# Patient Record
Sex: MALE | Race: WHITE | NOT HISPANIC OR LATINO | Employment: FULL TIME | URBAN - NONMETROPOLITAN AREA
[De-identification: names, ages, dates, MRNs, and addresses within clinical notes are randomized per-mention and may not be internally consistent; named-entity substitution may affect disease eponyms.]

---

## 2017-02-27 ENCOUNTER — OFFICE VISIT (OUTPATIENT)
Dept: UROLOGY | Facility: CLINIC | Age: 47
End: 2017-02-27

## 2017-02-27 DIAGNOSIS — R30.0 BURNING WITH URINATION: Primary | ICD-10-CM

## 2017-02-27 DIAGNOSIS — Z84.1 FAMILY HISTORY OF KIDNEY STONES: ICD-10-CM

## 2017-02-27 DIAGNOSIS — N13.8 BPH (BENIGN PROSTATIC HYPERTROPHY) WITH URINARY OBSTRUCTION: ICD-10-CM

## 2017-02-27 DIAGNOSIS — N40.1 BPH (BENIGN PROSTATIC HYPERTROPHY) WITH URINARY OBSTRUCTION: ICD-10-CM

## 2017-02-27 DIAGNOSIS — N41.1 CHRONIC PROSTATITIS: ICD-10-CM

## 2017-02-27 LAB
BILIRUB BLD-MCNC: NEGATIVE MG/DL
CLARITY, POC: CLEAR
COLOR UR: YELLOW
GLUCOSE UR STRIP-MCNC: NEGATIVE MG/DL
KETONES UR QL: NEGATIVE
LEUKOCYTE EST, POC: NEGATIVE
NITRITE UR-MCNC: NEGATIVE MG/ML
PH UR: 6 [PH] (ref 5–8)
PROT UR STRIP-MCNC: NEGATIVE MG/DL
RBC # UR STRIP: NEGATIVE /UL
SP GR UR: 1.01 (ref 1–1.03)
UROBILINOGEN UR QL: NORMAL

## 2017-02-27 PROCEDURE — 99214 OFFICE O/P EST MOD 30 MIN: CPT | Performed by: UROLOGY

## 2017-02-27 PROCEDURE — 76857 US EXAM PELVIC LIMITED: CPT | Performed by: UROLOGY

## 2017-02-27 PROCEDURE — 81003 URINALYSIS AUTO W/O SCOPE: CPT | Performed by: UROLOGY

## 2017-02-27 RX ORDER — AZITHROMYCIN 250 MG/1
TABLET, FILM COATED ORAL
Refills: 0 | COMMUNITY
Start: 2017-02-23 | End: 2017-05-24

## 2017-02-27 NOTE — PROGRESS NOTES
Chief Complaint  Follow-up (patient is having burning and pain with urination.)        POPPY Le is a 46 y.o. male who presents requesting evaluation with a recent history of dysuria.  He and his wife are  and he's had exposure to sexually transmitted disease.  Other than dysuria he denies other signs and symptoms such as visible lesions on the genital area and obvious discharge.  Previous screenings for chlamydia hepatitis and HIV were all negative.  He has recently completed a Z-Jonathan for upper respiratory infection but denies taking any decongestants and antihistamines.  He was seen at [facility] where he was thought to have a UTI and was started on Keflex.  The urine culture returned insignificant organisms.  Recently any new sexual contacts have been protected with condom.    There were no vitals filed for this visit.    Past Medical History  Past Medical History   Diagnosis Date   • Arthritis    • Deep vein thrombosis    • Hemochromatosis    • Hemochromatosis    • Hypertension    • Migraine    • Sleep apnea        Past Surgical History  Past Surgical History   Procedure Laterality Date   • Colonoscopy     • Eye surgery       LASIX   • Hand surgery     • Wrist surgery         Medications  has a current medication list which includes the following prescription(s): alprazolam, azithromycin, cephalexin, cephalexin, chlorhexidine, doxycycline, gabapentin, gnp antiseptic skin cleanser, ketoconazole, losartan, mupirocin, sertraline, testosterone cypionate, valacyclovir, viagra, and warfarin.      Allergies  No Known Allergies    Social History  Social History     Social History Narrative       Family History  He has no family history of bladder or kidney cancer  He has no family history of kidney stones      AUA Symptom Score:      Review of Systems  Review of Systems   Constitutional: Positive for fever.   HENT: Positive for sinus pressure.    Genitourinary: Positive for difficulty urinating,  dysuria and urgency.   All other systems reviewed and are negative.      Physical Exam  Physical Exam   Constitutional: He is oriented to person, place, and time. He appears well-developed.   HENT:   Head: Normocephalic and atraumatic.   Pulmonary/Chest: Effort normal. No respiratory distress.   Abdominal: Soft. He exhibits no distension and no mass. There is no tenderness. No hernia. Hernia confirmed negative in the right inguinal area and confirmed negative in the left inguinal area.   Genitourinary: Rectum normal, prostate normal, testes normal and penis normal.   Musculoskeletal: Normal range of motion.   Lymphadenopathy: No inguinal adenopathy noted on the right or left side.   Neurological: He is alert and oriented to person, place, and time.   Skin: Skin is warm and dry.   Psychiatric: He has a normal mood and affect. His behavior is normal.   Vitals reviewed.      Labs Recent and today in the office:  Results for orders placed or performed in visit on 02/27/17   POC Urinalysis Dipstick, Automated   Result Value Ref Range    Color Yellow Yellow, Straw, Dark Yellow, Marybeth    Clarity, UA Clear Clear    Glucose, UA Negative Negative, 1000 mg/dL (3+) mg/dL    Bilirubin Negative Negative    Ketones, UA Negative Negative    Specific Gravity  1.010 1.005 - 1.030    Blood, UA Negative Negative    pH, Urine 6.0 5.0 - 8.0    Protein, POC Negative Negative mg/dL    Urobilinogen, UA Normal Normal    Leukocytes Negative Negative    Nitrite, UA Negative Negative         Assessment & Plan  The prostate is only slightly tender ruling out acute prostatitis.  Massage of the prostate produces fluid and on microscopic exam and EPS is positive for red and white blood cells.  There was only a question of Trichomonas with known definitely seen.    Pelvic ultrasound reveals a significant problem emptying the bladder and this will need further evaluation.  I suggested he complete the current round of antibiotics obtain a CT scan rule  out stones since there is a family history and then return for cystoscopy if not improved.  He can use Azo-Standard for the dysuria for the time being.

## 2017-02-27 NOTE — PROGRESS NOTES
[facility]- UROLOGY  [address], [address]  [place], [place] [address]  [phone]      02/27/2017    Charli Le  1970        Pelvic Ultrasound with PVR    A transabdominal pelvic ultrasound was performed using a 3.5 MHz transducer of a B-K Caban through the suprapubic area.     The purpose of the study was to investigate the patient's voiding difficulty.  There was minimal bladder wall thickening noted.  There were no intravesical filling defects seen.  The post void residual of 244 ml was noted.  Prostate was small and was noted to be 27.4 grams.  There was a protrusion of the prostate into the bladder.  Ultrasound images will be scanned into the chart for reference.       CPT code 46684        Performed by Gavino Zambrano MD

## 2017-03-01 LAB
BACTERIA UR CULT: NO GROWTH
BACTERIA UR CULT: NORMAL

## 2017-03-03 ENCOUNTER — HOSPITAL ENCOUNTER (OUTPATIENT)
Dept: CT IMAGING | Facility: HOSPITAL | Age: 47
Discharge: HOME | End: 2017-03-03
Attending: UROLOGY | Admitting: UROLOGY

## 2017-03-03 PROCEDURE — 74176 CT ABD & PELVIS W/O CONTRAST: CPT

## 2017-03-10 ENCOUNTER — PROCEDURE VISIT (OUTPATIENT)
Dept: UROLOGY | Facility: CLINIC | Age: 47
End: 2017-03-10

## 2017-03-10 DIAGNOSIS — N40.1 BENIGN NODULAR PROSTATIC HYPERPLASIA WITH LOWER URINARY TRACT SYMPTOMS: ICD-10-CM

## 2017-03-10 DIAGNOSIS — R30.0 BURNING WITH URINATION: Primary | ICD-10-CM

## 2017-03-10 PROCEDURE — 99213 OFFICE O/P EST LOW 20 MIN: CPT | Performed by: UROLOGY

## 2017-03-10 PROCEDURE — 52000 CYSTOURETHROSCOPY: CPT | Performed by: UROLOGY

## 2017-03-10 RX ORDER — ALFUZOSIN HYDROCHLORIDE 10 MG/1
10 TABLET, EXTENDED RELEASE ORAL DAILY
Qty: 30 TABLET | Refills: 11 | Status: SHIPPED | OUTPATIENT
Start: 2017-03-10 | End: 2017-05-24

## 2017-03-10 NOTE — PROGRESS NOTES
Chief Complaint  Follow-up (cysto)        POPPY Le is a 47 y.o. male who returns today to complete his evaluation of dysuria and urinary retention.  He's caring a 3 ounce postvoid residual and complains of dysuria but his urine culture is negative and his urine is clear.  He does drink large amounts of caffeine.  He's had exposure to sexually transmitted disease but test from dermatology and infectious disease are basically all negative.        Returns today for further evaluation  There were no vitals filed for this visit.    Past Medical History  Past Medical History   Diagnosis Date   • Arthritis    • Deep vein thrombosis    • Hemochromatosis    • Hemochromatosis    • Hypertension    • Migraine    • Sleep apnea        Past Surgical History  Past Surgical History   Procedure Laterality Date   • Colonoscopy     • Eye surgery       LASIX   • Hand surgery     • Wrist surgery         Medications  has a current medication list which includes the following prescription(s): alprazolam, azithromycin, cephalexin, gabapentin, losartan, sertraline, testosterone cypionate, valacyclovir, viagra, and warfarin.      Allergies  No Known Allergies    Social History  Social History     Social History Narrative       Family History  He has no family history of bladder or kidney cancer  He has no family history of kidney stones      AUA Symptom Score:      Review of Systems  Review of Systems    Physical Exam  Physical Exam   Constitutional: He is oriented to person, place, and time. He appears well-developed and well-nourished.   HENT:   Head: Normocephalic and atraumatic.   Neck: Normal range of motion.   Pulmonary/Chest: Effort normal. No respiratory distress.   Abdominal: Soft. He exhibits no distension and no mass. There is no tenderness. No hernia.   Genitourinary: Penis normal.   Musculoskeletal: Normal range of motion.   Lymphadenopathy:     He has no cervical adenopathy.   Neurological: He is alert and oriented to  person, place, and time.   Skin: Skin is warm and dry.   Psychiatric: He has a normal mood and affect. His behavior is normal.   Vitals reviewed.      Labs Recent and today in the office:  Results for orders placed or performed in visit on 02/27/17   POC Urinalysis Dipstick, Automated   Result Value Ref Range    Color Yellow Yellow, Straw, Dark Yellow, Marybeth    Clarity, UA Clear Clear    Glucose, UA Negative Negative, 1000 mg/dL (3+) mg/dL    Bilirubin Negative Negative    Ketones, UA Negative Negative    Specific Gravity  1.010 1.005 - 1.030    Blood, UA Negative Negative    pH, Urine 6.0 5.0 - 8.0    Protein, POC Negative Negative mg/dL    Urobilinogen, UA Normal Normal    Leukocytes Negative Negative    Nitrite, UA Negative Negative     The patient is placed in the supine position prepped and draped in a routine sterile fashion and the anterior urethra anesthetized with sterile Xylocaine Jelly.  The flexible cystoscope is is inserted and used to visualize the anterior urethra which was found to be within normal limits.  The posterior fossa was obstructed from an enlarged prostate.  The interior of the bladder was free of foreign bodies or mucosal lesions.  The bladder seemed to be of normal capacity.  Other than some trabeculation the bladder mucosa was within normal limits.  The ureteral orifices were normal location with a normal appearance and effluxing clear urine.  The patient will be covered with antibiotics.    Assessment & Plan  The patient is able to void before 500 mL instilled into the bladder with a normal flow right so Uroxatral as prescribed and he can return when necessary.

## 2017-05-24 ENCOUNTER — OFFICE VISIT (OUTPATIENT)
Dept: NEUROLOGY | Facility: CLINIC | Age: 47
End: 2017-05-24

## 2017-05-24 VITALS
HEART RATE: 128 BPM | HEIGHT: 72 IN | WEIGHT: 229 LBS | OXYGEN SATURATION: 97 % | DIASTOLIC BLOOD PRESSURE: 84 MMHG | BODY MASS INDEX: 31.02 KG/M2 | SYSTOLIC BLOOD PRESSURE: 140 MMHG

## 2017-05-24 DIAGNOSIS — G47.34 NOCTURNAL OXYGEN DESATURATION: ICD-10-CM

## 2017-05-24 DIAGNOSIS — G47.19 EXCESSIVE DAYTIME SLEEPINESS: ICD-10-CM

## 2017-05-24 DIAGNOSIS — G47.33 OBSTRUCTIVE SLEEP APNEA: Primary | ICD-10-CM

## 2017-05-24 PROCEDURE — 99213 OFFICE O/P EST LOW 20 MIN: CPT | Performed by: PSYCHIATRY & NEUROLOGY

## 2017-12-18 ENCOUNTER — HOSPITAL ENCOUNTER (EMERGENCY)
Facility: HOSPITAL | Age: 47
Discharge: HOME | End: 2017-12-18
Attending: EMERGENCY MEDICINE | Admitting: EMERGENCY MEDICINE

## 2017-12-18 ENCOUNTER — APPOINTMENT (OUTPATIENT)
Dept: ULTRASOUND IMAGING | Facility: HOSPITAL | Age: 47
End: 2017-12-18

## 2017-12-18 VITALS
WEIGHT: 228 LBS | HEART RATE: 71 BPM | RESPIRATION RATE: 18 BRPM | HEIGHT: 72 IN | SYSTOLIC BLOOD PRESSURE: 145 MMHG | DIASTOLIC BLOOD PRESSURE: 90 MMHG | OXYGEN SATURATION: 98 % | BODY MASS INDEX: 30.88 KG/M2 | TEMPERATURE: 98.2 F

## 2017-12-18 DIAGNOSIS — M79.606 MUSCULOSKELETAL PAIN OF LOWER EXTREMITY, UNSPECIFIED LATERALITY: Primary | ICD-10-CM

## 2017-12-18 LAB
ANION GAP SERPL CALCULATED.3IONS-SCNC: 12.3 MMOL/L
BASOPHILS # BLD AUTO: 0.03 10*3/MM3 (ref 0–0.2)
BASOPHILS NFR BLD AUTO: 0.6 % (ref 0–2.5)
BUN BLD-MCNC: 16 MG/DL (ref 7–20)
BUN/CREAT SERPL: 16 (ref 6.3–21.9)
CALCIUM SPEC-SCNC: 9.3 MG/DL (ref 8.4–10.2)
CHLORIDE SERPL-SCNC: 99 MMOL/L (ref 98–107)
CO2 SERPL-SCNC: 33 MMOL/L (ref 26–30)
CREAT BLD-MCNC: 1 MG/DL (ref 0.6–1.3)
DEPRECATED RDW RBC AUTO: 45.1 FL (ref 37–54)
EOSINOPHIL # BLD AUTO: 0.07 10*3/MM3 (ref 0–0.7)
EOSINOPHIL NFR BLD AUTO: 1.4 % (ref 0–7)
ERYTHROCYTE [DISTWIDTH] IN BLOOD BY AUTOMATED COUNT: 12.3 % (ref 11.5–14.5)
GFR SERPL CREATININE-BSD FRML MDRD: 79 ML/MIN/1.73
GLUCOSE BLD-MCNC: 97 MG/DL (ref 74–98)
HCT VFR BLD AUTO: 50.3 % (ref 42–52)
HGB BLD-MCNC: 17.4 G/DL (ref 14–18)
IMM GRANULOCYTES # BLD: 0.02 10*3/MM3 (ref 0–0.06)
IMM GRANULOCYTES NFR BLD: 0.4 % (ref 0–0.6)
INR PPP: 1.87 (ref 0.9–1.1)
LYMPHOCYTES # BLD AUTO: 1.17 10*3/MM3 (ref 0.6–3.4)
LYMPHOCYTES NFR BLD AUTO: 24.1 % (ref 10–50)
MCH RBC QN AUTO: 34.3 PG (ref 27–31)
MCHC RBC AUTO-ENTMCNC: 34.6 G/DL (ref 30–37)
MCV RBC AUTO: 99.2 FL (ref 80–94)
MONOCYTES # BLD AUTO: 0.41 10*3/MM3 (ref 0–0.9)
MONOCYTES NFR BLD AUTO: 8.4 % (ref 0–12)
NEUTROPHILS # BLD AUTO: 3.16 10*3/MM3 (ref 2–6.9)
NEUTROPHILS NFR BLD AUTO: 65.1 % (ref 37–80)
NRBC BLD MANUAL-RTO: 0 /100 WBC (ref 0–0)
PLATELET # BLD AUTO: 117 10*3/MM3 (ref 130–400)
PMV BLD AUTO: 9 FL (ref 6–12)
POTASSIUM BLD-SCNC: 4.3 MMOL/L (ref 3.5–5.1)
PROTHROMBIN TIME: 21.1 SECONDS (ref 9.3–12.1)
RBC # BLD AUTO: 5.07 10*6/MM3 (ref 4.7–6.1)
SODIUM BLD-SCNC: 140 MMOL/L (ref 137–145)
WBC NRBC COR # BLD: 4.86 10*3/MM3 (ref 4.8–10.8)

## 2017-12-18 PROCEDURE — 99284 EMERGENCY DEPT VISIT MOD MDM: CPT

## 2017-12-18 PROCEDURE — 93970 EXTREMITY STUDY: CPT

## 2017-12-18 PROCEDURE — 85610 PROTHROMBIN TIME: CPT | Performed by: PHYSICIAN ASSISTANT

## 2017-12-18 PROCEDURE — 80048 BASIC METABOLIC PNL TOTAL CA: CPT | Performed by: PHYSICIAN ASSISTANT

## 2017-12-18 PROCEDURE — 85025 COMPLETE CBC W/AUTO DIFF WBC: CPT | Performed by: PHYSICIAN ASSISTANT

## 2017-12-18 RX ORDER — SENNOSIDES 8.6 MG
650 CAPSULE ORAL EVERY 8 HOURS PRN
Qty: 12 TABLET | Refills: 0 | Status: SHIPPED | OUTPATIENT
Start: 2017-12-18 | End: 2021-09-07

## 2017-12-18 RX ORDER — IBUPROFEN 600 MG/1
600 TABLET ORAL EVERY 8 HOURS PRN
Qty: 12 TABLET | Refills: 0 | Status: SHIPPED | OUTPATIENT
Start: 2017-12-18 | End: 2017-12-18 | Stop reason: HOSPADM

## 2017-12-18 RX ORDER — IBUPROFEN 600 MG/1
600 TABLET ORAL ONCE
Status: COMPLETED | OUTPATIENT
Start: 2017-12-18 | End: 2017-12-18

## 2017-12-18 RX ADMIN — IBUPROFEN 600 MG: 600 TABLET, FILM COATED ORAL at 13:27

## 2017-12-18 NOTE — ED PROVIDER NOTES
Subjective   HPI Comments: Patient is here with complaint of sore area to left thigh for the past 2 days also some slight soreness to the right thigh area.  Patient endorses history of DVT in the right leg currently on Coumadin patient follows his level closely denies fevers chills no chest pain or shortness of air reported patient has recently been on an airplane prolonged seating over the weekend  Is here for further evaluation  He does endorse getting some kind of massage therapy which may have aggravated his symptoms as well      Review of Systems   Constitutional: Negative.    HENT: Negative.    Eyes: Negative.    Respiratory: Negative.  Negative for shortness of breath.    Cardiovascular: Negative.    Gastrointestinal: Negative.    Musculoskeletal: Positive for arthralgias.   Skin: Negative.    Neurological: Negative.    Psychiatric/Behavioral: The patient is nervous/anxious.    All other systems reviewed and are negative.      Past Medical History:   Diagnosis Date   • Arthritis    • Deep vein thrombosis    • Hemochromatosis    • Hemochromatosis    • Hypertension    • Migraine    • Sleep apnea        No Known Allergies    Past Surgical History:   Procedure Laterality Date   • COLONOSCOPY     • EYE SURGERY      LASIX   • HAND SURGERY     • WRIST SURGERY         Family History   Problem Relation Age of Onset   • Family history unknown: Yes   • Arthritis Mother    • Hypertension Mother    • Hyperlipidemia Mother    • Arthritis Father    • Cancer Father    • Hypertension Father        Social History     Social History   • Marital status:      Spouse name: N/A   • Number of children: N/A   • Years of education: N/A     Social History Main Topics   • Smoking status: Never Smoker   • Smokeless tobacco: Never Used   • Alcohol use No   • Drug use: No   • Sexual activity: Defer     Other Topics Concern   • None     Social History Narrative           Objective   Physical Exam   Constitutional: He is oriented to  person, place, and time. He appears well-developed and well-nourished.   Afebrile nontoxic no acute distress//ambulatory in the room   HENT:   Head: Normocephalic.   Mouth/Throat: Oropharynx is clear and moist.   Eyes: EOM are normal. Pupils are equal, round, and reactive to light.   Neck: Normal range of motion. Neck supple.   Cardiovascular: Normal rate, regular rhythm and intact distal pulses.    Pulmonary/Chest: Effort normal and breath sounds normal.   Abdominal: Soft.   Musculoskeletal: Normal range of motion. He exhibits no edema.   Minimal tenderness left medial thigh and right medial thigh no surrounding erythema or fluctuance   Neurological: He is alert and oriented to person, place, and time.   Skin: Skin is warm and dry. No rash noted.   Psychiatric: His behavior is normal. Judgment and thought content normal.   Nursing note and vitals reviewed.      Procedures         ED Course  ED Course   Value Comment By Time   US Venous Doppler Lower Extremity Bilateral (duplex) (Reviewed) Eric Asif PA-C 12/18 2417    Patient case and management reviewed with Dr. Cortés... We'll have him follow up with Dr. Valdes continue follow-up with INR Eric Asif PA-C 12/18 6584                  MDM    Final diagnoses:   Musculoskeletal pain of lower extremity, unspecified laterality            Eric Asif PA-C  12/18/17 1500

## 2018-01-25 ENCOUNTER — OFFICE VISIT (OUTPATIENT)
Dept: UROLOGY | Facility: CLINIC | Age: 48
End: 2018-01-25

## 2018-01-25 VITALS
OXYGEN SATURATION: 100 % | RESPIRATION RATE: 18 BRPM | HEART RATE: 74 BPM | TEMPERATURE: 98.5 F | DIASTOLIC BLOOD PRESSURE: 71 MMHG | SYSTOLIC BLOOD PRESSURE: 138 MMHG

## 2018-01-25 DIAGNOSIS — Z87.898 HISTORY OF DYSURIA: Primary | ICD-10-CM

## 2018-01-25 DIAGNOSIS — N53.19 OTHER EJACULATORY DYSFUNCTION: ICD-10-CM

## 2018-01-25 DIAGNOSIS — N40.1 BPH WITH URINARY OBSTRUCTION: ICD-10-CM

## 2018-01-25 DIAGNOSIS — E29.1 HYPOGONADISM MALE: ICD-10-CM

## 2018-01-25 DIAGNOSIS — N13.8 BPH WITH URINARY OBSTRUCTION: ICD-10-CM

## 2018-01-25 LAB
BILIRUB BLD-MCNC: NEGATIVE MG/DL
CLARITY, POC: CLEAR
COLOR UR: YELLOW
GLUCOSE UR STRIP-MCNC: NEGATIVE MG/DL
KETONES UR QL: NEGATIVE
LEUKOCYTE EST, POC: NEGATIVE
NITRITE UR-MCNC: NEGATIVE MG/ML
PH UR: 6 [PH] (ref 5–8)
PROT UR STRIP-MCNC: NEGATIVE MG/DL
PSA SERPL-MCNC: 1.08 NG/ML (ref 0.06–4)
RBC # UR STRIP: NEGATIVE /UL
SP GR UR: 1.03 (ref 1–1.03)
UROBILINOGEN UR QL: NORMAL

## 2018-01-25 PROCEDURE — 99214 OFFICE O/P EST MOD 30 MIN: CPT | Performed by: UROLOGY

## 2018-01-25 PROCEDURE — 81003 URINALYSIS AUTO W/O SCOPE: CPT | Performed by: UROLOGY

## 2018-01-25 RX ORDER — POLYETHYLENE GLYCOL-3350 AND ELECTROLYTES 236; 6.74; 5.86; 2.97; 22.74 G/274.31G; G/274.31G; G/274.31G; G/274.31G; G/274.31G
POWDER, FOR SOLUTION ORAL
Refills: 0 | COMMUNITY
Start: 2018-01-23 | End: 2021-09-07

## 2018-01-25 NOTE — PROGRESS NOTES
"Chief Complaint  history of dysuria (fup 10 months.)        POPPY Le is a 47 y.o. male who returns today for annual checkup primarily wanting his prostate evaluated in light of turning 48 next month.  He's had no recurrence of his dysuria and sensation of difficulty voiding since he underwent a cystoscopy and was \"flushed out\".  His hypogonadism and low testosterone are managed by endocrinology and Berto states he checks the estradiol level on his own.  He has hemochromatosis and donates blood every month.  He denies any difficulty voiding with an AUA index today of 3.  His voided urine today is clear and negative for blood bacteria and white cells.  He has no erectile dysfunction but does complain of ejaculatory dysfunction.  He takes gabapentin twice a day along with sotalol for a panic disorder that probably explains this dysfunction.    Vitals:    01/25/18 1302   BP: 138/71   Pulse: 74   Resp: 18   Temp: 98.5 °F (36.9 °C)   SpO2: 100%       Past Medical History  Past Medical History:   Diagnosis Date   • Arthritis    • Deep vein thrombosis    • Hemochromatosis    • Hemochromatosis    • Hypertension    • Migraine    • Sleep apnea        Past Surgical History  Past Surgical History:   Procedure Laterality Date   • COLONOSCOPY     • EYE SURGERY      LASIX   • HAND SURGERY     • WRIST SURGERY         Medications  has a current medication list which includes the following prescription(s): acetaminophen, gabapentin, losartan, sertraline, valacyclovir, warfarin, alprazolam, gavilyte-g, and testosterone cypionate.      Allergies  No Known Allergies    Social History  Social History     Social History Narrative       Family History  He has no family history of bladder or kidney cancer  He has no family history of kidney stones      AUA Symptom Score:      Review of Systems  Review of Systems    Physical Exam  Physical Exam   Constitutional: He is oriented to person, place, and time. He appears well-developed and " well-nourished.   HENT:   Head: Normocephalic and atraumatic.   Neck: Normal range of motion.   Pulmonary/Chest: Effort normal. No respiratory distress.   Abdominal: Soft. He exhibits no distension and no mass. There is no tenderness. No hernia.   Genitourinary: Rectum normal and prostate normal.   Musculoskeletal: Normal range of motion.   Lymphadenopathy:     He has no cervical adenopathy.   Neurological: He is alert and oriented to person, place, and time.   Skin: Skin is warm and dry.   Psychiatric: He has a normal mood and affect. His behavior is normal.   Vitals reviewed.      Labs Recent and today in the office:  Results for orders placed or performed in visit on 01/25/18   POC Urinalysis Dipstick, Automated   Result Value Ref Range    Color Yellow Yellow, Straw, Dark Yellow, Marybeth    Clarity, UA Clear Clear    Glucose, UA Negative Negative, 1000 mg/dL (3+) mg/dL    Bilirubin Negative Negative    Ketones, UA Negative Negative    Specific Gravity  1.030 1.005 - 1.030    Blood, UA Negative Negative    pH, Urine 6.0 5.0 - 8.0    Protein, POC Negative Negative mg/dL    Urobilinogen, UA Normal Normal    Leukocytes Negative Negative    Nitrite, UA Negative Negative         Assessment & Plan  Hypogonadism: Currently being managed by endocrinology.    BPH: Currently voiding without difficulty no dysuria and clear urine.  Digital rectal exam today is benign but a PSA is obtained and if normal he is encouraged to eat a heart healthy diet and return on an annual basis.    Ejaculatory dysfunction: I think this is related to medication and no treatment is required except for discontinuing that if it's a major concern.

## 2018-06-07 ENCOUNTER — OFFICE VISIT (OUTPATIENT)
Dept: UROLOGY | Facility: CLINIC | Age: 48
End: 2018-06-07

## 2018-06-07 VITALS — BODY MASS INDEX: 30.61 KG/M2 | WEIGHT: 226 LBS | HEIGHT: 72 IN

## 2018-06-07 DIAGNOSIS — N40.1 BPH WITH URINARY OBSTRUCTION: ICD-10-CM

## 2018-06-07 DIAGNOSIS — E29.1 HYPOGONADISM MALE: ICD-10-CM

## 2018-06-07 DIAGNOSIS — Z87.898 HISTORY OF DYSURIA: Primary | ICD-10-CM

## 2018-06-07 DIAGNOSIS — N13.8 BPH WITH URINARY OBSTRUCTION: ICD-10-CM

## 2018-06-07 DIAGNOSIS — N50.9 GENITAL DISORDER, MALE: ICD-10-CM

## 2018-06-07 PROCEDURE — 99213 OFFICE O/P EST LOW 20 MIN: CPT | Performed by: UROLOGY

## 2018-06-07 PROCEDURE — 81003 URINALYSIS AUTO W/O SCOPE: CPT | Performed by: UROLOGY

## 2018-06-07 NOTE — PROGRESS NOTES
Chief Complaint  Benign Prostatic Hypertrophy (personal problem patient wants to discuss.); Hypogonadism; and Difficulty Urinating (history of dyuria.)        POPPY Le is a 48 y.o. male who requested consultation because he is been having some strange twinges of discomfort in the penis recently unrelated to sexual activity or even erections.  He denies any progression in his lifelong slight curvature and his had no palpable plaques discharge rash or numbness.    He is known have a defect that his L5-S1 that they are following through Dr. Abrams office.    There were no vitals filed for this visit.    Past Medical History  Past Medical History:   Diagnosis Date   • Arthritis    • Deep vein thrombosis    • Hemochromatosis    • Hemochromatosis    • Hypertension    • Migraine    • Sleep apnea        Past Surgical History  Past Surgical History:   Procedure Laterality Date   • COLONOSCOPY     • EYE SURGERY      LASIX   • HAND SURGERY     • WRIST SURGERY         Medications  has a current medication list which includes the following prescription(s): acetaminophen, alprazolam, gabapentin, gavilyte-g, losartan, sertraline, testosterone cypionate, valacyclovir, and warfarin.      Allergies  No Known Allergies    Social History  Social History     Social History Narrative   • No narrative on file       Family History  He has no family history of bladder or kidney cancer  He has no family history of kidney stones      AUA Symptom Score:      Review of Systems  Review of Systems    Physical Exam  Physical Exam   Constitutional: He is oriented to person, place, and time. He appears well-developed and well-nourished.   HENT:   Head: Normocephalic and atraumatic.   Neck: Normal range of motion.   Pulmonary/Chest: Effort normal. No respiratory distress.   Abdominal: Soft. He exhibits no distension and no mass. There is no tenderness. No hernia.   Genitourinary: Penis normal.   Musculoskeletal: Normal range of motion.    Lymphadenopathy:     He has no cervical adenopathy.   Neurological: He is alert and oriented to person, place, and time.   Skin: Skin is warm and dry.   Psychiatric: He has a normal mood and affect. His behavior is normal.   Vitals reviewed.      Labs Recent and today in the office:  Results for orders placed or performed in visit on 01/25/18   PSA DIAGNOSTIC   Result Value Ref Range    PSA 1.080 0.060 - 4.000 ng/mL   POC Urinalysis Dipstick, Automated   Result Value Ref Range    Color Yellow Yellow, Straw, Dark Yellow, Marybeth    Clarity, UA Clear Clear    Glucose, UA Negative Negative, 1000 mg/dL (3+) mg/dL    Bilirubin Negative Negative    Ketones, UA Negative Negative    Specific Gravity  1.030 1.005 - 1.030    Blood, UA Negative Negative    pH, Urine 6.0 5.0 - 8.0    Protein, POC Negative Negative mg/dL    Urobilinogen, UA Normal Normal    Leukocytes Negative Negative    Nitrite, UA Negative Negative         Assessment & Plan  Genital discomfort: Fortunately no pathology seen on physical exam and I'm suspicious this may be an early neurological deficit.  We'll observe at this point but he'll return if additional symptoms or progression is noted.

## 2018-09-10 ENCOUNTER — TELEPHONE (OUTPATIENT)
Dept: NEUROLOGY | Facility: CLINIC | Age: 48
End: 2018-09-10

## 2018-09-10 NOTE — TELEPHONE ENCOUNTER
PT called stating that his machine is no longer working.  He needs an order for a new machine.      Patient can be reached at [phone]

## 2019-04-08 ENCOUNTER — APPOINTMENT (OUTPATIENT)
Dept: CT IMAGING | Facility: HOSPITAL | Age: 49
End: 2019-04-08

## 2019-04-08 ENCOUNTER — HOSPITAL ENCOUNTER (EMERGENCY)
Facility: HOSPITAL | Age: 49
Discharge: HOME | End: 2019-04-08
Attending: STUDENT IN AN ORGANIZED HEALTH CARE EDUCATION/TRAINING PROGRAM | Admitting: STUDENT IN AN ORGANIZED HEALTH CARE EDUCATION/TRAINING PROGRAM

## 2019-04-08 ENCOUNTER — APPOINTMENT (OUTPATIENT)
Dept: ULTRASOUND IMAGING | Facility: HOSPITAL | Age: 49
End: 2019-04-08

## 2019-04-08 VITALS
DIASTOLIC BLOOD PRESSURE: 95 MMHG | RESPIRATION RATE: 18 BRPM | HEART RATE: 98 BPM | SYSTOLIC BLOOD PRESSURE: 141 MMHG | OXYGEN SATURATION: 96 % | BODY MASS INDEX: 30.48 KG/M2 | HEIGHT: 72 IN | TEMPERATURE: 98.1 F | WEIGHT: 225 LBS

## 2019-04-08 DIAGNOSIS — I80.9 THROMBOPHLEBITIS: Primary | ICD-10-CM

## 2019-04-08 LAB
ALBUMIN SERPL-MCNC: 4.4 G/DL (ref 3.5–5)
ALBUMIN/GLOB SERPL: 1.6 G/DL (ref 1–2)
ALP SERPL-CCNC: 44 U/L (ref 38–126)
ALT SERPL W P-5'-P-CCNC: 66 U/L (ref 13–69)
ANION GAP SERPL CALCULATED.3IONS-SCNC: 12.9 MMOL/L (ref 10–20)
AST SERPL-CCNC: 40 U/L (ref 15–46)
BASOPHILS # BLD AUTO: 0.04 10*3/MM3 (ref 0–0.2)
BASOPHILS NFR BLD AUTO: 0.7 % (ref 0–1.5)
BILIRUB SERPL-MCNC: 0.5 MG/DL (ref 0.2–1.3)
BUN BLD-MCNC: 19 MG/DL (ref 7–20)
BUN/CREAT SERPL: 23.8 (ref 6.3–21.9)
CALCIUM SPEC-SCNC: 9.6 MG/DL (ref 8.4–10.2)
CHLORIDE SERPL-SCNC: 100 MMOL/L (ref 98–107)
CO2 SERPL-SCNC: 30 MMOL/L (ref 26–30)
CREAT BLD-MCNC: 0.8 MG/DL (ref 0.6–1.3)
DEPRECATED RDW RBC AUTO: 45.7 FL (ref 37–54)
EOSINOPHIL # BLD AUTO: 0.12 10*3/MM3 (ref 0–0.4)
EOSINOPHIL NFR BLD AUTO: 2.2 % (ref 0.3–6.2)
ERYTHROCYTE [DISTWIDTH] IN BLOOD BY AUTOMATED COUNT: 12.4 % (ref 12.3–15.4)
GFR SERPL CREATININE-BSD FRML MDRD: 102 ML/MIN/1.73
GLOBULIN UR ELPH-MCNC: 2.7 GM/DL
GLUCOSE BLD-MCNC: 91 MG/DL (ref 74–98)
HCT VFR BLD AUTO: 50.5 % (ref 37.5–51)
HGB BLD-MCNC: 17.4 G/DL (ref 13–17.7)
IMM GRANULOCYTES # BLD AUTO: 0.03 10*3/MM3 (ref 0–0.05)
IMM GRANULOCYTES NFR BLD AUTO: 0.5 % (ref 0–0.5)
LYMPHOCYTES # BLD AUTO: 1.35 10*3/MM3 (ref 0.7–3.1)
LYMPHOCYTES NFR BLD AUTO: 24.4 % (ref 19.6–45.3)
MCH RBC QN AUTO: 34.1 PG (ref 26.6–33)
MCHC RBC AUTO-ENTMCNC: 34.5 G/DL (ref 31.5–35.7)
MCV RBC AUTO: 99 FL (ref 79–97)
MONOCYTES # BLD AUTO: 0.49 10*3/MM3 (ref 0.1–0.9)
MONOCYTES NFR BLD AUTO: 8.8 % (ref 5–12)
NEUTROPHILS # BLD AUTO: 3.51 10*3/MM3 (ref 1.4–7)
NEUTROPHILS NFR BLD AUTO: 63.4 % (ref 42.7–76)
NRBC BLD AUTO-RTO: 0 /100 WBC (ref 0–0)
PLATELET # BLD AUTO: 129 10*3/MM3 (ref 140–450)
PMV BLD AUTO: 9.7 FL (ref 6–12)
POTASSIUM BLD-SCNC: 3.9 MMOL/L (ref 3.5–5.1)
PROT SERPL-MCNC: 7.1 G/DL (ref 6.3–8.2)
RBC # BLD AUTO: 5.1 10*6/MM3 (ref 4.14–5.8)
SODIUM BLD-SCNC: 139 MMOL/L (ref 137–145)
WBC NRBC COR # BLD: 5.54 10*3/MM3 (ref 3.4–10.8)

## 2019-04-08 PROCEDURE — 25010000002 IOPAMIDOL 61 % SOLUTION: Performed by: STUDENT IN AN ORGANIZED HEALTH CARE EDUCATION/TRAINING PROGRAM

## 2019-04-08 PROCEDURE — 99283 EMERGENCY DEPT VISIT LOW MDM: CPT

## 2019-04-08 PROCEDURE — 93971 EXTREMITY STUDY: CPT

## 2019-04-08 PROCEDURE — 85025 COMPLETE CBC W/AUTO DIFF WBC: CPT | Performed by: NURSE PRACTITIONER

## 2019-04-08 PROCEDURE — 71275 CT ANGIOGRAPHY CHEST: CPT

## 2019-04-08 PROCEDURE — 70491 CT SOFT TISSUE NECK W/DYE: CPT

## 2019-04-08 PROCEDURE — 80053 COMPREHEN METABOLIC PANEL: CPT | Performed by: NURSE PRACTITIONER

## 2019-04-08 RX ORDER — SODIUM CHLORIDE 0.9 % (FLUSH) 0.9 %
10 SYRINGE (ML) INJECTION AS NEEDED
Status: DISCONTINUED | OUTPATIENT
Start: 2019-04-08 | End: 2019-04-08 | Stop reason: HOSPADM

## 2019-04-08 RX ADMIN — SODIUM CHLORIDE 1000 ML: 9 INJECTION, SOLUTION INTRAVENOUS at 18:40

## 2019-04-08 RX ADMIN — IOPAMIDOL 100 ML: 612 INJECTION, SOLUTION INTRAVENOUS at 19:14

## 2019-04-08 RX ADMIN — IOPAMIDOL 50 ML: 612 INJECTION, SOLUTION INTRAVENOUS at 19:16

## 2019-04-08 NOTE — ED PROVIDER NOTES
Subjective   History of Present Illness  Is a 49-year-old gentleman who comes in today complaining of left hand swelling and tenderness.  He also reports recent C-spine surgery and having pain with swallowing in his throat and left chest wall.  He went to his primary care provider today who sent him here to have evaluation for a DVT in his left arm.  He has a history of multiple blood clots and has been on Coumadin for quite some time.  However, he went off the Coumadin for 12 days to have his surgery.  He is 7 days postop.  He denies any shortness of breath, fever chills nausea or vomiting.  Review of Systems   Constitutional: Negative.    HENT: Negative.    Eyes: Negative.    Respiratory: Negative.    Gastrointestinal: Positive for abdominal pain.   Endocrine: Negative.    Musculoskeletal:        Complaint of Left wrist pain with swelling.   Allergic/Immunologic: Negative.    Neurological: Negative.    Hematological: Negative.    Psychiatric/Behavioral: Negative.    All other systems reviewed and are negative.      Past Medical History:   Diagnosis Date   • Arthritis    • Deep vein thrombosis (CMS/HCC)    • Hemochromatosis    • Hemochromatosis    • Hypertension    • Migraine    • Sleep apnea        No Known Allergies    Past Surgical History:   Procedure Laterality Date   • COLONOSCOPY     • EYE SURGERY      LASIX   • HAND SURGERY     • SPINAL FUSION     • WRIST SURGERY         Family History   Family history unknown: Yes   Problem Relation Age of Onset   • Arthritis Mother    • Hypertension Mother    • Hyperlipidemia Mother    • Arthritis Father    • Cancer Father    • Hypertension Father        Social History     Socioeconomic History   • Marital status:      Spouse name: Not on file   • Number of children: Not on file   • Years of education: Not on file   • Highest education level: Not on file   Tobacco Use   • Smoking status: Never Smoker   • Smokeless tobacco: Never Used   Substance and Sexual Activity    • Alcohol use: No   • Drug use: No   • Sexual activity: Defer           Objective   Physical Exam   Constitutional: He appears well-developed and well-nourished.   Nursing note and vitals reviewed.  GEN: No acute distress  Head: Normocephalic, atraumatic  Eyes: Pupils equal round reactive to light  ENT: Posterior pharynx normal in appearance, oral mucosa is moist  Chest: Nontender to palpation  Cardiovascular: Regular rate  Lungs: Clear to auscultation bilaterally  Abdomen: Soft, nontender, nondistended, no peritoneal signs, nontender with palpation.   Extremities: No edema, normal appearance  Neuro: GCS 15  Psych: Mood and affect are appropriate      Procedures           ED Course  ED Course as of Apr 08 2023 Mon Apr 08, 2019 2005 I have discussed the findings with the patient and advised him to follow up with his PCP. I have given him strict return to care instructions and he is agreeable to this plan of care.   [TW]   2023 Called to  Neurosurgery discussed with Dr. Santos. He did advised the patient could start back his coumadin.   [TW]      ED Course User Index  [TW] Lanette Todd, APRN                  MDM  Number of Diagnoses or Management Options  Diagnosis management comments: Differential diagnosis would include phlebitis, DVT of left extremity.  Also concern for DVT with pain with swallowing to the left lower chest wall.  Pain with swallowing is a concern of a possible hematoma postsurgical.       Amount and/or Complexity of Data Reviewed  Clinical lab tests: ordered and reviewed  Tests in the radiology section of CPT®: ordered and reviewed  Review and summarize past medical records: yes  Discuss the patient with other providers: yes    Risk of Complications, Morbidity, and/or Mortality  Presenting problems: moderate  Diagnostic procedures: moderate  Management options: moderate          Final diagnoses:   Thrombophlebitis            Lanette Todd APRN  04/08/19 2006       Lanette Todd  ENRIQUE WILL  04/08/19 2023

## 2019-04-09 NOTE — ED NOTES
2011:  MD[facility] CALLED PER [facility] REQUESTING NEURO SURGERY. PLACED ON HOLD THEN CALL SENT TO KATE @ 2020.     Marybeth Sprague  04/08/19 2020

## 2019-04-12 ENCOUNTER — TRANSCRIBE ORDERS (OUTPATIENT)
Dept: ADMINISTRATIVE | Facility: HOSPITAL | Age: 49
End: 2019-04-12

## 2019-04-12 ENCOUNTER — HOSPITAL ENCOUNTER (OUTPATIENT)
Dept: ULTRASOUND IMAGING | Facility: HOSPITAL | Age: 49
Discharge: HOME | End: 2019-04-12
Admitting: INTERNAL MEDICINE

## 2019-04-12 DIAGNOSIS — I82.A11 ACUTE DEEP VEIN THROMBOSIS (DVT) OF AXILLARY VEIN OF RIGHT UPPER EXTREMITY (HCC): Primary | ICD-10-CM

## 2019-04-12 DIAGNOSIS — I82.A11 ACUTE DEEP VEIN THROMBOSIS (DVT) OF AXILLARY VEIN OF RIGHT UPPER EXTREMITY (HCC): ICD-10-CM

## 2019-04-12 PROCEDURE — 93971 EXTREMITY STUDY: CPT

## 2019-06-17 ENCOUNTER — HOSPITAL ENCOUNTER (OUTPATIENT)
Dept: ULTRASOUND IMAGING | Facility: HOSPITAL | Age: 49
Discharge: HOME | End: 2019-06-17
Admitting: INTERNAL MEDICINE

## 2019-06-17 ENCOUNTER — TRANSCRIBE ORDERS (OUTPATIENT)
Dept: ADMINISTRATIVE | Facility: HOSPITAL | Age: 49
End: 2019-06-17

## 2019-06-17 DIAGNOSIS — E83.119 HEMOCHROMATOSIS, UNSPECIFIED HEMOCHROMATOSIS TYPE: ICD-10-CM

## 2019-06-17 DIAGNOSIS — E83.119 HEMOCHROMATOSIS, UNSPECIFIED HEMOCHROMATOSIS TYPE: Primary | ICD-10-CM

## 2019-06-17 PROCEDURE — 76700 US EXAM ABDOM COMPLETE: CPT

## 2019-12-25 ENCOUNTER — HOSPITAL ENCOUNTER (EMERGENCY)
Facility: HOSPITAL | Age: 49
Discharge: HOME | End: 2019-12-25
Attending: STUDENT IN AN ORGANIZED HEALTH CARE EDUCATION/TRAINING PROGRAM | Admitting: STUDENT IN AN ORGANIZED HEALTH CARE EDUCATION/TRAINING PROGRAM

## 2019-12-25 ENCOUNTER — APPOINTMENT (OUTPATIENT)
Dept: CT IMAGING | Facility: HOSPITAL | Age: 49
End: 2019-12-25

## 2019-12-25 VITALS
DIASTOLIC BLOOD PRESSURE: 67 MMHG | WEIGHT: 225 LBS | HEART RATE: 79 BPM | SYSTOLIC BLOOD PRESSURE: 148 MMHG | BODY MASS INDEX: 30.48 KG/M2 | OXYGEN SATURATION: 99 % | RESPIRATION RATE: 17 BRPM | TEMPERATURE: 98.5 F | HEIGHT: 72 IN

## 2019-12-25 DIAGNOSIS — T78.40XA ALLERGIC REACTION, INITIAL ENCOUNTER: Primary | ICD-10-CM

## 2019-12-25 LAB
ALBUMIN SERPL-MCNC: 4.3 G/DL (ref 3.5–5.2)
ALBUMIN/GLOB SERPL: 1.5 G/DL
ALP SERPL-CCNC: 55 U/L (ref 39–117)
ALT SERPL W P-5'-P-CCNC: 50 U/L (ref 1–41)
ANION GAP SERPL CALCULATED.3IONS-SCNC: 14.5 MMOL/L (ref 5–15)
AST SERPL-CCNC: 39 U/L (ref 1–40)
BASOPHILS # BLD AUTO: 0.05 10*3/MM3 (ref 0–0.2)
BASOPHILS NFR BLD AUTO: 1.4 % (ref 0–1.5)
BILIRUB SERPL-MCNC: 0.3 MG/DL (ref 0.2–1.2)
BUN BLD-MCNC: 12 MG/DL (ref 6–20)
BUN/CREAT SERPL: 14.6 (ref 7–25)
CALCIUM SPEC-SCNC: 8.9 MG/DL (ref 8.6–10.5)
CHLORIDE SERPL-SCNC: 102 MMOL/L (ref 98–107)
CO2 SERPL-SCNC: 23.5 MMOL/L (ref 22–29)
CREAT BLD-MCNC: 0.82 MG/DL (ref 0.76–1.27)
DEPRECATED RDW RBC AUTO: 44.5 FL (ref 37–54)
EOSINOPHIL # BLD AUTO: 0.09 10*3/MM3 (ref 0–0.4)
EOSINOPHIL NFR BLD AUTO: 2.5 % (ref 0.3–6.2)
ERYTHROCYTE [DISTWIDTH] IN BLOOD BY AUTOMATED COUNT: 12.2 % (ref 12.3–15.4)
GFR SERPL CREATININE-BSD FRML MDRD: 99 ML/MIN/1.73
GLOBULIN UR ELPH-MCNC: 2.8 GM/DL
GLUCOSE BLD-MCNC: 111 MG/DL (ref 65–99)
HCT VFR BLD AUTO: 50.3 % (ref 37.5–51)
HGB BLD-MCNC: 17.6 G/DL (ref 13–17.7)
IMM GRANULOCYTES # BLD AUTO: 0.03 10*3/MM3 (ref 0–0.05)
IMM GRANULOCYTES NFR BLD AUTO: 0.8 % (ref 0–0.5)
INR PPP: 2.28 (ref 0.9–1.1)
LYMPHOCYTES # BLD AUTO: 1.16 10*3/MM3 (ref 0.7–3.1)
LYMPHOCYTES NFR BLD AUTO: 32.8 % (ref 19.6–45.3)
MCH RBC QN AUTO: 34.4 PG (ref 26.6–33)
MCHC RBC AUTO-ENTMCNC: 35 G/DL (ref 31.5–35.7)
MCV RBC AUTO: 98.4 FL (ref 79–97)
MONOCYTES # BLD AUTO: 0.39 10*3/MM3 (ref 0.1–0.9)
MONOCYTES NFR BLD AUTO: 11 % (ref 5–12)
NEUTROPHILS # BLD AUTO: 1.82 10*3/MM3 (ref 1.7–7)
NEUTROPHILS NFR BLD AUTO: 51.5 % (ref 42.7–76)
NRBC BLD AUTO-RTO: 0 /100 WBC (ref 0–0.2)
PLATELET # BLD AUTO: 152 10*3/MM3 (ref 140–450)
PMV BLD AUTO: 9.2 FL (ref 6–12)
POTASSIUM BLD-SCNC: 4.1 MMOL/L (ref 3.5–5.2)
PROT SERPL-MCNC: 7.1 G/DL (ref 6–8.5)
PROTHROMBIN TIME: 25.7 SECONDS (ref 12–15.1)
RBC # BLD AUTO: 5.11 10*6/MM3 (ref 4.14–5.8)
SODIUM BLD-SCNC: 140 MMOL/L (ref 136–145)
WBC NRBC COR # BLD: 3.54 10*3/MM3 (ref 3.4–10.8)

## 2019-12-25 PROCEDURE — 96375 TX/PRO/DX INJ NEW DRUG ADDON: CPT

## 2019-12-25 PROCEDURE — 80053 COMPREHEN METABOLIC PANEL: CPT | Performed by: PHYSICIAN ASSISTANT

## 2019-12-25 PROCEDURE — 99283 EMERGENCY DEPT VISIT LOW MDM: CPT

## 2019-12-25 PROCEDURE — 70450 CT HEAD/BRAIN W/O DYE: CPT

## 2019-12-25 PROCEDURE — 96374 THER/PROPH/DIAG INJ IV PUSH: CPT

## 2019-12-25 PROCEDURE — 25010000002 DIPHENHYDRAMINE PER 50 MG: Performed by: PHYSICIAN ASSISTANT

## 2019-12-25 PROCEDURE — 85610 PROTHROMBIN TIME: CPT | Performed by: PHYSICIAN ASSISTANT

## 2019-12-25 PROCEDURE — 85025 COMPLETE CBC W/AUTO DIFF WBC: CPT | Performed by: PHYSICIAN ASSISTANT

## 2019-12-25 RX ORDER — SODIUM CHLORIDE 0.9 % (FLUSH) 0.9 %
10 SYRINGE (ML) INJECTION AS NEEDED
Status: DISCONTINUED | OUTPATIENT
Start: 2019-12-25 | End: 2019-12-25 | Stop reason: HOSPADM

## 2019-12-25 RX ORDER — METHYLPREDNISOLONE SODIUM SUCCINATE 125 MG/2ML
125 INJECTION, POWDER, LYOPHILIZED, FOR SOLUTION INTRAMUSCULAR; INTRAVENOUS ONCE
Status: DISCONTINUED | OUTPATIENT
Start: 2019-12-25 | End: 2019-12-25 | Stop reason: HOSPADM

## 2019-12-25 RX ORDER — DIPHENHYDRAMINE HYDROCHLORIDE 50 MG/ML
25 INJECTION INTRAMUSCULAR; INTRAVENOUS ONCE
Status: COMPLETED | OUTPATIENT
Start: 2019-12-25 | End: 2019-12-25

## 2019-12-25 RX ORDER — FAMOTIDINE 10 MG/ML
20 INJECTION, SOLUTION INTRAVENOUS ONCE
Status: COMPLETED | OUTPATIENT
Start: 2019-12-25 | End: 2019-12-25

## 2019-12-25 RX ADMIN — FAMOTIDINE 20 MG: 10 INJECTION, SOLUTION INTRAVENOUS at 13:43

## 2019-12-25 RX ADMIN — DIPHENHYDRAMINE HYDROCHLORIDE 25 MG: 50 INJECTION INTRAMUSCULAR; INTRAVENOUS at 13:41

## 2020-01-31 ENCOUNTER — TREATMENT (OUTPATIENT)
Dept: PHYSICAL THERAPY | Facility: CLINIC | Age: 50
End: 2020-01-31

## 2020-01-31 ENCOUNTER — TRANSCRIBE ORDERS (OUTPATIENT)
Dept: PHYSICAL THERAPY | Facility: CLINIC | Age: 50
End: 2020-01-31

## 2020-01-31 DIAGNOSIS — Z98.1 S/P SPINAL FUSION: Primary | ICD-10-CM

## 2020-01-31 DIAGNOSIS — S29.019A THORACIC MYOFASCIAL STRAIN, INITIAL ENCOUNTER: Primary | ICD-10-CM

## 2020-01-31 PROCEDURE — DRYNDL PR CUSTOM DRY NEEDLING SELF PAY: Performed by: PHYSICAL THERAPIST

## 2020-01-31 NOTE — PROGRESS NOTES
Physical Therapy Initial Evaluation and Plan of Care      Patient: Charli Le   : 1970  Diagnosis/ICD-10 Code:  Thoracic myofascial strain, initial encounter [S29.019A]  Referring practitioner: No ref. provider found    Subjective Evaluation    History of Present Illness  Mechanism of injury: Pt with history of neck and back pain.  Pt with recent C/S fusion in 2019 and another procedure for fusion of adjacent section on 2020.    Since the procedure he has had multiple areas of spasm in the T/S and Lumbar spine.  He is currently not having much discomfort at rest but with motion he is noted to have pain and tightness.       Patient Occupation: [employer] Quality of life: good    Pain  Current pain ratin  At worst pain ratin  Location: R T/S and R Rib area.   Quality: sharp, tight, knife-like and pressure  Relieving factors: relaxation and rest  Aggravating factors: prolonged positioning and repetitive movement  Progression: no change    Treatments  Previous treatment: physical therapy  Patient Goals  Patient goals for therapy: decreased pain and return to sport/leisure activities             Objective       Palpation   Left   Hypertonic in the cervical paraspinals.   Tenderness of the cervical paraspinals.     Right   Hypertonic in the cervical interspinals, cervical paraspinals, lower trapezius, middle trapezius and upper trapezius. Tenderness of the cervical interspinals, cervical paraspinals, lower trapezius, middle trapezius and upper trapezius.     Additional Palpation Details  R T/S T-5-T11 very hypertonic and guarded.  There is a large area of MM hypertrophy or edema in that area.     Active Range of Motion     Additional Active Range of Motion Details  T/ S pain with motion noted.          Assessment & Plan     Assessment  Impairments: abnormal muscle firing, abnormal muscle tone, abnormal or restricted ROM and pain with function  Assessment details: Patient is a 49 year  old male who comes to physical therapy S/p Cervical spine fusion. Signs and symptoms are consistent with mm spasms and hypomobility of the T/S.  The patient currently has pain, decreased ROM, decreased strength, and inability to perform all essential functional activities. Pt will benefit from skilled PT services to address the above issues.     Prognosis: fair  Prognosis details:   SHORT TERM GOALS:     2 weeks  1. Pt independent with HEP  2. Pt to demonstrate Thoracic AROM 50-75% of expected norms to allow for improved ability to perform ADL's  3. Pt to report not having any headaches related to neck pain for the past 3 days    LONG TERM GOALS:   6 weeks  1. Pt to demonstrate thoracic AROM % of expected norms to allow for improved safety when driving  2. Pt to demonstrate ability to lift 10# OH with bilateral arm(s) without increase in pain in the back/neck    3. Pt to report being able to work full shift or work in the home without increase in pain in the neck or back    Functional Limitations: carrying objects, lifting, sleeping, pulling, pushing, uncomfortable because of pain, reaching behind back, reaching overhead and unable to perform repetitive tasks  Plan  Therapy options: will be seen for skilled physical therapy services  Planned modality interventions: TENS  Other planned modality interventions: DN   Treatment plan discussed with: patient  Plan details: Pt to be seen for PT for 2-3 x / week for DN of the T/S and C/S if needed.         Manual Therapy:         mins  33260;  Therapeutic Exercise:         mins  61040;     Neuromuscular Nick:        mins  35289;    Therapeutic Activity:          mins  75296;     Gait Training:           mins  52075;     Ultrasound:          mins  19876;    Electrical Stimulation:         mins  81502 ( );  Dry Needling     28     mins self-pay    Timed Treatment:      mins   Total Treatment:     28   mins    PT SIGNATURE: Anpu Massey, PT   DATE  TREATMENT INITIATED: 1/31/2020    Initial Certification  Certification Period: 4/30/2020  I certify that the therapy services are furnished while this patient is under my care.  The services outlined above are required by this patient, and will be reviewed every 90 days.     PHYSICIAN:       DATE:     Please sign and return via fax to  .. Thank you, [facility].

## 2020-02-03 ENCOUNTER — TREATMENT (OUTPATIENT)
Dept: PHYSICAL THERAPY | Facility: CLINIC | Age: 50
End: 2020-02-03

## 2020-02-03 DIAGNOSIS — S29.019A THORACIC MYOFASCIAL STRAIN, INITIAL ENCOUNTER: Primary | ICD-10-CM

## 2020-02-03 PROCEDURE — DRYNDL PR CUSTOM DRY NEEDLING SELF PAY: Performed by: PHYSICAL THERAPIST

## 2020-02-03 NOTE — PROGRESS NOTES
Physical Therapy Daily Progress Note    Patient Information  Charli Le  1970      Visit # : 2    Charli Le reports 0/10 pain today at rest.  Pt reports he felt much better after the last PT session.  The DN seemed to work well.         Objective Pt presents to PT today with no distress noted.     Decreased pain post DN      See Exercise, Manual, and Modality Logs for complete treatment.     Assessment/Plan  Pt with improved function and less pain.       Progress per Plan of Care  Try DN lower in the Lumbar spine.     Visit Diagnoses:    ICD-10-CM ICD-9-CM   1. Thoracic myofascial strain, initial encounter S29.019A 847.1            Manual Therapy:         mins  18859;  Therapeutic Exercise:         mins  08169;     Neuromuscular Nick:        mins  38200;    Therapeutic Activity:          mins  44820;     Gait Training:        ___  mins  71253;     Ultrasound:          mins  63955;    Electrical Stimulation:         mins  34665 ( );  Dry Needling    20      mins self-pay    Timed Treatment:  20    mins   Total Treatment:     20   mins    Anup Massey, PT  Physical Therapist

## 2020-02-06 ENCOUNTER — TREATMENT (OUTPATIENT)
Dept: PHYSICAL THERAPY | Facility: CLINIC | Age: 50
End: 2020-02-06

## 2020-02-06 DIAGNOSIS — S29.019A THORACIC MYOFASCIAL STRAIN, INITIAL ENCOUNTER: Primary | ICD-10-CM

## 2020-02-06 PROCEDURE — DRYNDL PR CUSTOM DRY NEEDLING SELF PAY: Performed by: PHYSICAL THERAPIST

## 2020-02-06 NOTE — PROGRESS NOTES
Physical Therapy Daily Progress Note    Patient Information  Charli Le  1970      Visit # : 3    Charli Le reports 2/10 pain today at rest.  Pt reports he is feeling better.  Pt reports the DN seems to be helping         Objective Pt presents to PT today with no distress at rest.     Pt with less MM guarding in the R T/S Paraspinal MM and less tension with testing.       See Exercise, Manual, and Modality Logs for complete treatment.     Assessment/Plan  Pt is getting relief with the DN and TNS.        Progress per Plan of Care      Visit Diagnoses:    ICD-10-CM ICD-9-CM   1. Thoracic myofascial strain, initial encounter S29.019A 847.1            Manual Therapy:         mins  68832;  Therapeutic Exercise:         mins  72583;     Neuromuscular Nick:        mins  11235;    Therapeutic Activity:          mins  62493;     Gait Training:        ___  mins  17034;     Ultrasound:          mins  67916;    Electrical Stimulation:         mins  18951 ( );  Dry Needling    20      mins self-pay    Timed Treatment:      mins   Total Treatment:     20   mins    Anup Massey, PT  Physical Therapist

## 2020-02-10 ENCOUNTER — TREATMENT (OUTPATIENT)
Dept: PHYSICAL THERAPY | Facility: CLINIC | Age: 50
End: 2020-02-10

## 2020-02-10 DIAGNOSIS — S29.019A THORACIC MYOFASCIAL STRAIN, INITIAL ENCOUNTER: Primary | ICD-10-CM

## 2020-02-10 PROCEDURE — DRYNDL PR CUSTOM DRY NEEDLING SELF PAY: Performed by: PHYSICAL THERAPIST

## 2020-02-10 NOTE — PROGRESS NOTES
Physical Therapy Daily Progress Note    Patient Information  Charli Le  1970      Visit # : 4    Charli Le reports 1-2/10 pain today at rest.  Pt reports he is feeling better overall and the MM is less tense.         Objective Pt presents to PT today with no distress noted.     There is more MM hypertonicity note in the R T/S paraspinal area.  T/S mobility is limited.     The Upper T/S and Trap is much less tense.       See Exercise, Manual, and Modality Logs for complete treatment.     Assessment/Plan  Pt with overall improved status.        Progress per Plan of Care and Progress strengthening /stabilization /functional activity      Visit Diagnoses:    ICD-10-CM ICD-9-CM   1. Thoracic myofascial strain, initial encounter S29.019A 847.1            Manual Therapy:         mins  94996;  Therapeutic Exercise:         mins  41071;     Neuromuscular Nick:        mins  70481;    Therapeutic Activity:          mins  79134;     Gait Training:        ___  mins  72165;     Ultrasound:          mins  74148;    Electrical Stimulation:         mins  16588 ( );  Dry Needling    25      mins self-pay    Timed Treatment:   25   mins   Total Treatment:     25   mins    Anup Massey, PT  Physical Therapist

## 2020-02-12 ENCOUNTER — TREATMENT (OUTPATIENT)
Dept: PHYSICAL THERAPY | Facility: CLINIC | Age: 50
End: 2020-02-12

## 2020-02-12 DIAGNOSIS — S29.019A THORACIC MYOFASCIAL STRAIN, INITIAL ENCOUNTER: Primary | ICD-10-CM

## 2020-02-12 PROCEDURE — DRYNDL PR CUSTOM DRY NEEDLING SELF PAY: Performed by: PHYSICAL THERAPIST

## 2020-02-12 NOTE — PROGRESS NOTES
Physical Therapy Daily Progress Note    Patient Information  Charli Le  1970      Visit # : 5    Charli Le reports 0/10 pain today at rest.  Pt with less pain and tightness in the spine.  He reports this is feeling better.     Occasional shooting pain in the R L/S area.     Objective Pt presents to PT today with no distress noted.     R T/S PS MM less hypertonicity noted.       See Exercise, Manual, and Modality Logs for complete treatment.     Assessment/Plan  Pt with less pain noted. Pt is responding well.       Progress per Plan of Care and Progress strengthening /stabilization /functional activity      Visit Diagnoses:    ICD-10-CM ICD-9-CM   1. Thoracic myofascial strain, initial encounter S29.019A 847.1            Manual Therapy:         mins  84270;  Therapeutic Exercise:         mins  80588;     Neuromuscular Nick:        mins  11343;    Therapeutic Activity:          mins  70811;     Gait Training:        ___  mins  27086;     Ultrasound:          mins  39358;    Electrical Stimulation:         mins  30713 ( );  Dry Needling   25       mins self-pay    Timed Treatment:   25   mins   Total Treatment:     25   mins    Anup Massey, PT  Physical Therapist

## 2020-02-18 ENCOUNTER — TELEPHONE (OUTPATIENT)
Dept: URGENT CARE | Facility: CLINIC | Age: 50
End: 2020-02-18

## 2020-02-18 ENCOUNTER — TREATMENT (OUTPATIENT)
Dept: PHYSICAL THERAPY | Facility: CLINIC | Age: 50
End: 2020-02-18

## 2020-02-18 DIAGNOSIS — J02.0 STREP THROAT: Primary | ICD-10-CM

## 2020-02-18 DIAGNOSIS — S29.019A THORACIC MYOFASCIAL STRAIN, INITIAL ENCOUNTER: Primary | ICD-10-CM

## 2020-02-18 PROCEDURE — DRYNDL PR CUSTOM DRY NEEDLING SELF PAY: Performed by: PHYSICAL THERAPIST

## 2020-02-18 RX ORDER — AMOXICILLIN 875 MG/1
TABLET, COATED ORAL
Qty: 20 TABLET | Refills: 0 | Status: SHIPPED | OUTPATIENT
Start: 2020-02-18 | End: 2021-09-07

## 2020-02-18 NOTE — PROGRESS NOTES
Physical Therapy Daily Progress Note    Patient Information  Charli Le  1970      Visit # : 6    Cahrli Le reports 5/10 pain today at rest.  Pt with pain elevated in the T/S and Rib area.  With twisting 7/10 pain.          Objective Pt presents to PT area today with minimal distress with ambulation and moderate distress with transfers.     Post DN at rest 1-2/10 pain.      Pt's R Paraspinal MM hypertonic and guarded.     See Exercise, Manual, and Modality Logs for complete treatment.     Assessment/Plan  Pt with DN improving resting pain.       Progress per Plan of Care  Check response to DN.      Visit Diagnoses:    ICD-10-CM ICD-9-CM   1. Thoracic myofascial strain, initial encounter S29.019A 847.1            Manual Therapy:         mins  41001;  Therapeutic Exercise:         mins  14162;     Neuromuscular Nick:        mins  01817;    Therapeutic Activity:          mins  81390;     Gait Training:        ___  mins  05100;     Ultrasound:          mins  17633;    Electrical Stimulation:         mins  40975 ( );  Dry Needling   28       mins self-pay    Timed Treatment:      mins   Total Treatment:     28   mins    Anup Massey, PT  Physical Therapist

## 2020-02-21 ENCOUNTER — TREATMENT (OUTPATIENT)
Dept: PHYSICAL THERAPY | Facility: CLINIC | Age: 50
End: 2020-02-21

## 2020-02-21 DIAGNOSIS — S29.019A THORACIC MYOFASCIAL STRAIN, INITIAL ENCOUNTER: Primary | ICD-10-CM

## 2020-02-21 PROCEDURE — DRYNDL PR CUSTOM DRY NEEDLING SELF PAY: Performed by: PHYSICAL THERAPIST

## 2020-03-04 ENCOUNTER — TREATMENT (OUTPATIENT)
Dept: PHYSICAL THERAPY | Facility: CLINIC | Age: 50
End: 2020-03-04

## 2020-03-04 DIAGNOSIS — S29.019A THORACIC MYOFASCIAL STRAIN, INITIAL ENCOUNTER: Primary | ICD-10-CM

## 2020-03-04 PROCEDURE — DRYNDL PR CUSTOM DRY NEEDLING SELF PAY: Performed by: PHYSICAL THERAPIST

## 2020-03-04 NOTE — PROGRESS NOTES
Physical Therapy Daily Progress Note    Patient Information  Charli Le  1970      Visit # : 8    Charli Le reports 2/10 pain today at rest.  Pt reports he is feeling some better.         Objective Pt presents to PT today with no distress noted at rest.      Palpation:  Tenderness at the paraspinal in the T/S and Upper L/S.      Post DN sxs are reduced.     See Exercise, Manual, and Modality Logs for complete treatment.     Assessment/Plan  Pt with T/S hypertonicity is much less in the T/S.       Progress per Plan of Care and Progress strengthening /stabilization /functional activity      Visit Diagnoses:    ICD-10-CM ICD-9-CM   1. Thoracic myofascial strain, initial encounter S29.019A 847.1            Manual Therapy:         mins  23939;  Therapeutic Exercise:         mins  07361;     Neuromuscular Nick:        mins  66154;    Therapeutic Activity:          mins  20029;     Gait Training:        ___  mins  62394;     Ultrasound:          mins  48676;    Electrical Stimulation:         mins  10888 ( );  Dry Needling   24       mins self-pay    Timed Treatment:   24   mins   Total Treatment:     35   mins    Anup Massey, PT  Physical Therapist

## 2020-03-06 ENCOUNTER — TREATMENT (OUTPATIENT)
Dept: PHYSICAL THERAPY | Facility: CLINIC | Age: 50
End: 2020-03-06

## 2020-03-06 DIAGNOSIS — S29.019A THORACIC MYOFASCIAL STRAIN, INITIAL ENCOUNTER: Primary | ICD-10-CM

## 2020-03-06 PROCEDURE — DRYNDL PR CUSTOM DRY NEEDLING SELF PAY: Performed by: PHYSICAL THERAPIST

## 2020-03-06 NOTE — PROGRESS NOTES
Physical Therapy Daily Progress Note    Patient Information  Charli Le  1970      Visit # : 9    Charli Le  Pt reports he feels like he is having more carry over.          Objective Pt presents to PT today with no distress noted.     R T/S and L/S  Paraspinal hypertonic.      Pt with less pain after PT.     See Exercise, Manual, and Modality Logs for complete treatment.     Assessment/Plan  Pt with improved function and less pain.        Progress per Plan of Care and Progress strengthening /stabilization /functional activity      Visit Diagnoses:    ICD-10-CM ICD-9-CM   1. Thoracic myofascial strain, initial encounter S29.019A 847.1            Manual Therapy:         mins  13024;  Therapeutic Exercise:         mins  10240;     Neuromuscular Nick:        mins  20392;    Therapeutic Activity:          mins  12382;     Gait Training:        ___  mins  81290;     Ultrasound:          mins  72430;    Electrical Stimulation:         mins  05857 ( );  Dry Needling   20       mins self-pay    Timed Treatment:   20   mins   Total Treatment:     20   mins    Anup Massey, PT  Physical Therapist

## 2020-03-09 ENCOUNTER — TREATMENT (OUTPATIENT)
Dept: PHYSICAL THERAPY | Facility: CLINIC | Age: 50
End: 2020-03-09

## 2020-03-09 DIAGNOSIS — S29.019A THORACIC MYOFASCIAL STRAIN, INITIAL ENCOUNTER: Primary | ICD-10-CM

## 2020-03-09 PROCEDURE — DRYNDL PR CUSTOM DRY NEEDLING SELF PAY: Performed by: PHYSICAL THERAPIST

## 2020-03-09 NOTE — PROGRESS NOTES
Physical Therapy Daily Progress Note    Patient Information  Charli Le  1970      Visit # : 10    Charli Le reports 3/10 pain today at rest.  Pt reports good carry over with the DN.  His pain seems to be less.     Less sharpnesss of pain noted with motion.     Objective Pt presents to PT today with no distress noted.     R T/S paraspinal is guarded but not as guarded as before.     See Exercise, Manual, and Modality Logs for complete treatment.     Assessment/Plan  Pt with less pain overall and improved function.        Progress per Plan of Care      Visit Diagnoses:    ICD-10-CM ICD-9-CM   1. Thoracic myofascial strain, initial encounter S29.019A 847.1            Manual Therapy:         mins  15675;  Therapeutic Exercise:         mins  95721;     Neuromuscular Nick:        mins  01278;    Therapeutic Activity:          mins  60022;     Gait Training:        ___  mins  73297;     Ultrasound:          mins  30896;    Electrical Stimulation:         mins  61101 ( );  Dry Needling   20       mins self-pay    Timed Treatment:   20   mins   Total Treatment:     20   mins    Anup Massey, PT  Physical Therapist

## 2020-03-11 ENCOUNTER — TREATMENT (OUTPATIENT)
Dept: PHYSICAL THERAPY | Facility: CLINIC | Age: 50
End: 2020-03-11

## 2020-03-11 DIAGNOSIS — S29.019A THORACIC MYOFASCIAL STRAIN, INITIAL ENCOUNTER: Primary | ICD-10-CM

## 2020-03-11 PROCEDURE — DRYNDL PR CUSTOM DRY NEEDLING SELF PAY: Performed by: PHYSICAL THERAPIST

## 2020-03-11 NOTE — PROGRESS NOTES
Physical Therapy Daily Progress Note    Patient Information  Charli Limamackenzie  1970      Visit # : 11    Charli Le   Pt reports he is feeling better overall.  The tightness in the back seem to be a little higher today.         Objective Pt presents to PT today with no distress noted.     Palpation:  Hypertonicity and tightness in the R T/S paraspinal and the low trap on the R side.       See Exercise, Manual, and Modality Logs for complete treatment.     Assessment/Plan  Pt with less pain overall.  He seems to be able to move better.       Progress per Plan of Care and Progress strengthening /stabilization /functional activity      Visit Diagnoses:    ICD-10-CM ICD-9-CM   1. Thoracic myofascial strain, initial encounter S29.019A 847.1            Manual Therapy:         mins  44551;  Therapeutic Exercise:         mins  23912;     Neuromuscular Nick:        mins  69621;    Therapeutic Activity:          mins  76393;     Gait Training:        ___  mins  91414;     Ultrasound:          mins  55612;    Electrical Stimulation:         mins  77873 ( );  Dry Needling    20      mins self-pay    Timed Treatment:   20   mins   Total Treatment:     20   mins    Anup Massey, PT  Physical Therapist

## 2020-03-13 ENCOUNTER — TREATMENT (OUTPATIENT)
Dept: PHYSICAL THERAPY | Facility: CLINIC | Age: 50
End: 2020-03-13

## 2020-03-13 DIAGNOSIS — S29.019A THORACIC MYOFASCIAL STRAIN, INITIAL ENCOUNTER: Primary | ICD-10-CM

## 2020-03-13 PROCEDURE — DRYNDL PR CUSTOM DRY NEEDLING SELF PAY: Performed by: PHYSICAL THERAPIST

## 2020-03-13 NOTE — PROGRESS NOTES
Physical Therapy Daily Progress Note    Patient Information  Charli Le  1970      Visit # : 12    Charli Le reports 2/10 pain today at rest.  Pt with about 24 hours of carry over now.  Pt with improved overall improvement.          Objective Pt presents to PT today with no distress noted.     R Paraspinal is still hypertonic and guarded.      DN seemed to reduce pain and tightness in the area.     See Exercise, Manual, and Modality Logs for complete treatment.     Assessment/Plan  Pt with improved tolerance to functional activity and less pain.       Continue with DN.     Visit Diagnoses:    ICD-10-CM ICD-9-CM   1. Thoracic myofascial strain, initial encounter S29.019A 847.1            Manual Therapy:         mins  98386;  Therapeutic Exercise:         mins  98890;     Neuromuscular Nick:        mins  91482;    Therapeutic Activity:          mins  40324;     Gait Training:        ___  mins  13242;     Ultrasound:          mins  96303;    Electrical Stimulation:         mins  97691 ( );  Dry Needling     21     mins self-pay    Timed Treatment:   21   mins   Total Treatment:     21   mins    Anup Massey, PT  Physical Therapist

## 2020-03-16 ENCOUNTER — TREATMENT (OUTPATIENT)
Dept: PHYSICAL THERAPY | Facility: CLINIC | Age: 50
End: 2020-03-16

## 2020-03-16 DIAGNOSIS — S29.019A THORACIC MYOFASCIAL STRAIN, INITIAL ENCOUNTER: Primary | ICD-10-CM

## 2020-03-16 PROCEDURE — DRYNDL PR CUSTOM DRY NEEDLING SELF PAY: Performed by: PHYSICAL THERAPIST

## 2020-03-16 NOTE — PROGRESS NOTES
Physical Therapy Daily Progress Note    Patient Information  Charli Le  1970      Visit # : 13    Charli Le reports 5/10 pain today at rest.  Pt reports his pain is slightly elevated at the R scapular region for some unknown reason.         Objective Pt presents to PT today with minimal distress noted.      Palpation: primary area of pain noted today is in the R T 5 area       See Exercise, Manual, and Modality Logs for complete treatment.     Assessment/Plan  Pt with improved function overall.  He does seem to have some elevated pain this morning in the upper T/S.       Progress per Plan of Care      Visit Diagnoses:    ICD-10-CM ICD-9-CM   1. Thoracic myofascial strain, initial encounter S29.019A 847.1            Manual Therapy:         mins  90150;  Therapeutic Exercise:         mins  97871;     Neuromuscular Nick:        mins  26639;    Therapeutic Activity:          mins  51228;     Gait Training:        ___  mins  58349;     Ultrasound:          mins  14512;    Electrical Stimulation:         mins  92397 ( );  Dry Needling   22       mins self-pay    Timed Treatment:   22   mins   Total Treatment:     22   mins    Anup Massey, PT  Physical Therapist

## 2020-03-18 ENCOUNTER — TREATMENT (OUTPATIENT)
Dept: PHYSICAL THERAPY | Facility: CLINIC | Age: 50
End: 2020-03-18

## 2020-03-18 DIAGNOSIS — S29.019A THORACIC MYOFASCIAL STRAIN, INITIAL ENCOUNTER: Primary | ICD-10-CM

## 2020-03-18 PROCEDURE — DRYNDL PR CUSTOM DRY NEEDLING SELF PAY: Performed by: PHYSICAL THERAPIST

## 2020-03-18 NOTE — PROGRESS NOTES
Physical Therapy Daily Progress Note    Patient Information  Charli Le  1970      Visit # : 14    Charli Le reports 0.5/10 pain today at rest.  Pt reports he felt much better after last DN session.  He has almost been completely pain free since.         Objective Pt presents to PT today with much less guarding and MM tonicity.     Pt with less pain and less guarding with DN to the R spine area.     See Exercise, Manual, and Modality Logs for complete treatment.     Assessment/Plan  Pt with improved overall activity.  He is doing much better with DN.       Progress per Plan of Care and Progress strengthening /stabilization /functional activity      Visit Diagnoses:    ICD-10-CM ICD-9-CM   1. Thoracic myofascial strain, initial encounter S29.019A 847.1            Manual Therapy:         mins  57476;  Therapeutic Exercise:         mins  18617;     Neuromuscular Nick:        mins  50142;    Therapeutic Activity:          mins  41499;     Gait Training:        ___  mins  98596;     Ultrasound:          mins  69707;    Electrical Stimulation:         mins  35658 ( );  Dry Needling    20      mins self-pay    Timed Treatment:   20   mins   Total Treatment:     20   mins    Anup Massey, PT  Physical Therapist

## 2020-05-26 NOTE — PROGRESS NOTES
Physical Therapy Daily Progress Note    Patient Information  Charli Limamackenzie  1970      Visit # : 7    Charli Le reports 1-2/10 pain today at rest.  Pt reports he was feeling better after last DN session and the spasm was much less.         Objective Pt presents to PT today with no distress noted.     R T/S hypertonicity is much less today prior to DN.      Post DN sxs seem much less.     See Exercise, Manual, and Modality Logs for complete treatment.     Assessment/Plan  Pt with improved status today and less guarding and pain noted in the T/S.       Progress per Plan of Care and Progress strengthening /stabilization /functional activity      Visit Diagnoses:    ICD-10-CM ICD-9-CM   1. Thoracic myofascial strain, initial encounter S29.019A 847.1            Manual Therapy:         mins  92283;  Therapeutic Exercise:         mins  70124;     Neuromuscular Nick:        mins  66963;    Therapeutic Activity:          mins  01191;     Gait Training:        ___  mins  12354;     Ultrasound:          mins  49386;    Electrical Stimulation:         mins  33416 ( );  Dry Needling   25       mins self-pay    Timed Treatment:   25   mins   Total Treatment:     25   mins    Anup Massey, PT  Physical Therapist        
No

## 2020-06-11 ENCOUNTER — OFFICE VISIT (OUTPATIENT)
Dept: UROLOGY | Facility: CLINIC | Age: 50
End: 2020-06-11

## 2020-06-11 VITALS
RESPIRATION RATE: 16 BRPM | SYSTOLIC BLOOD PRESSURE: 124 MMHG | TEMPERATURE: 97.4 F | HEART RATE: 97 BPM | DIASTOLIC BLOOD PRESSURE: 80 MMHG | OXYGEN SATURATION: 99 %

## 2020-06-11 DIAGNOSIS — N28.1 RENAL CYST: Primary | ICD-10-CM

## 2020-06-11 DIAGNOSIS — N52.9 ERECTILE DYSFUNCTION, UNSPECIFIED ERECTILE DYSFUNCTION TYPE: ICD-10-CM

## 2020-06-11 DIAGNOSIS — N52.9 ERECTILE DYSFUNCTION, UNSPECIFIED ERECTILE DYSFUNCTION TYPE: Primary | ICD-10-CM

## 2020-06-11 DIAGNOSIS — R33.8 ACUTE URINARY RETENTION: ICD-10-CM

## 2020-06-11 DIAGNOSIS — E29.1 HYPOGONADISM MALE: ICD-10-CM

## 2020-06-11 PROCEDURE — 81003 URINALYSIS AUTO W/O SCOPE: CPT | Performed by: UROLOGY

## 2020-06-11 PROCEDURE — 76857 US EXAM PELVIC LIMITED: CPT | Performed by: UROLOGY

## 2020-06-11 PROCEDURE — 99214 OFFICE O/P EST MOD 30 MIN: CPT | Performed by: UROLOGY

## 2020-06-11 RX ORDER — WARFARIN SODIUM 7.5 MG/1
TABLET ORAL
COMMUNITY

## 2020-06-11 RX ORDER — SILDENAFIL CITRATE 20 MG/1
TABLET ORAL
Qty: 30 TABLET | Refills: 3 | Status: SHIPPED | OUTPATIENT
Start: 2020-06-11 | End: 2020-10-19

## 2020-06-11 RX ORDER — WARFARIN SODIUM 2 MG/1
TABLET ORAL
COMMUNITY
End: 2021-02-23 | Stop reason: ALTCHOICE

## 2020-06-11 NOTE — PROGRESS NOTES
[facility]- UROLOGY  [address], [address]  [place], [place] [address]  [phone]      06/11/2020    Charli Le  1970        Pelvic Ultrasound with PVR    A transabdominal pelvic ultrasound was performed using a 3.5 MHz transducer of a B-K [employer]an through the suprapubic area.     The purpose of the study was to investigate possible urinary retention.  There was minimal bladder wall thickening noted.  There were no intravesical filling defects seen.  The post void residual of 47.7 ml was noted.  Prostate was homogeneous and was noted to be 19.5 grams.  There was no protrusion of the prostate into the bladder.  Ultrasound images will be scanned into the chart for reference.       CPT code 83818        Performed by Gavino Zambrano MD

## 2020-06-11 NOTE — PROGRESS NOTES
Chief Complaint  Enlarged urinary Bladder on MRI        POPPY Le is a 50 y.o. male who is referred for urological evaluation with several major concerns.  He has had a lot of spinal problems having had cervical disc surgery already this year and being followed for thoracic and lumbar problems.  On a recent MRI he was noted to have some cysts on the kidneys and a distended bladder.  The cysts were noted on a CT scan in 2017 but were not even mentioned on an ultrasound in 2019 suggested little progression.  He had a recent renal ultrasound with those results are not available today.  He is reassured that these typically are of no significance but could require follow-up and therefore I recommend another CT scan in 6 months to make sure there is no progression.  In regards to the distended bladder he returns today denying any difficulty voiding except for a little dribbling after he voids.  Voided urine today is clear.    The patient has hypogonadism and takes testosterone supplement followed elsewhere.  He promises he gets routine blood work and is taking anastrozole to control his estrogen level which is a major cause of the cardiovascular risk.    He had another problem are concerned about erectile dysfunction with less nocturnal erections and less duration of his erections.  He is informed that testosterone is not a cure for this problem which is typically vascular related in a nondiabetic.  He is encouraged to eat more of a plant-based heart healthy diet to reduce the risk of progression of his atherosclerosis.    Vitals:    06/11/20 1058   BP: 124/80   Pulse: 97   Resp: 16   Temp: 97.4 °F (36.3 °C)   SpO2: 99%       Past Medical History  Past Medical History:   Diagnosis Date   • Arthritis    • Deep vein thrombosis (CMS/HCC)    • Hemochromatosis    • Hemochromatosis    • Hypertension    • Migraine    • Sleep apnea    • Spine pain        Past Surgical History  Past Surgical History:   Procedure Laterality  Date   • CERVICAL FUSION     • COLONOSCOPY     • EYE SURGERY      LASIX   • HAND SURGERY     • SPINAL FUSION     • WRIST SURGERY         Medications  has a current medication list which includes the following prescription(s): acetaminophen, alprazolam, gabapentin, losartan, sertraline, testosterone cypionate, valacyclovir, warfarin, warfarin, warfarin, amoxicillin, gavilyte-g, ondansetron odt, and oseltamivir.      Allergies  No Known Allergies    Social History  Social History     Socioeconomic History   • Marital status:      Spouse name: Not on file   • Number of children: Not on file   • Years of education: Not on file   • Highest education level: Not on file   Tobacco Use   • Smoking status: Never Smoker   • Smokeless tobacco: Never Used   Substance and Sexual Activity   • Alcohol use: No   • Drug use: No   • Sexual activity: Defer       Family History  He has no family history of bladder or kidney cancer  He has no family history of kidney stones      AUA Symptom Score:      Review of Systems  Review of Systems   Constitutional: Negative for activity change, appetite change, chills, fatigue, fever, unexpected weight gain and unexpected weight loss.   Respiratory: Negative for apnea, cough, chest tightness, shortness of breath, wheezing and stridor.    Cardiovascular: Negative for chest pain, palpitations and leg swelling.   Gastrointestinal: Negative for abdominal distention, abdominal pain, anal bleeding, blood in stool, constipation, diarrhea, nausea, rectal pain, vomiting, GERD and indigestion.   Genitourinary: Positive for erectile dysfunction. Negative for decreased libido, decreased urine volume, difficulty urinating, discharge, dysuria, flank pain, frequency, genital sores, hematuria, nocturia, penile pain, penile swelling, scrotal swelling, testicular pain, urgency and urinary incontinence.   Musculoskeletal: Negative for back pain and joint swelling.   Neurological: Negative for tremors,  seizures, speech difficulty, weakness and numbness.   Psychiatric/Behavioral: Negative for agitation, decreased concentration, sleep disturbance, depressed mood and stress. The patient is not nervous/anxious.        Physical Exam  Physical Exam   Constitutional: He is oriented to person, place, and time. He appears well-developed and well-nourished.   HENT:   Head: Normocephalic and atraumatic.   Neck: Normal range of motion.   Pulmonary/Chest: Effort normal. No respiratory distress.   Abdominal: Soft. He exhibits no distension and no mass. There is no tenderness. No hernia.   Genitourinary: Rectum normal and prostate normal.   Musculoskeletal: Normal range of motion.   Lymphadenopathy:     He has no cervical adenopathy.   Neurological: He is alert and oriented to person, place, and time.   Skin: Skin is warm and dry.   Psychiatric: He has a normal mood and affect. His behavior is normal.   Vitals reviewed.      Labs Recent and today in the office:  Results for orders placed or performed in visit on 06/11/20   POC Urinalysis Dipstick, Automated   Result Value Ref Range    Color Yellow Yellow, Straw, Dark Yellow, Marybeth    Clarity, UA Clear Clear    Specific Gravity  1.015 1.005 - 1.030    pH, Urine 6.0 5.0 - 8.0    Leukocytes Negative Negative    Nitrite, UA Negative Negative    Protein, POC Negative Negative mg/dL    Glucose, UA Negative Negative, 1000 mg/dL (3+) mg/dL    Ketones, UA Negative Negative    Urobilinogen, UA Normal Normal    Bilirubin Negative Negative    Blood, UA Negative Negative         Assessment & Plan  BPH with outlet obstruction: Digital rectal exam today is benign and a PSA was recently measured at 0.75.  Pelvic ultrasound today reveals less than 2 ounces of post void residual and a small prostate at 19.5 g.  I suspect he just needed to void at the time of his MRI and this is a nonissue.    Erectile dysfunction: The importance of eating a plant-based heart healthy diet is presented in detail  and Viagra as prescribed.  He is warned not to take this within 4 hours of his other antihypertensive medications.  His hypogonadism is treated and monitored elsewhere.    Renal cysts: I suspect these are of little significance but recommend a follow-up ultrasound study in 6 months to make sure there is no progression.

## 2020-10-17 DIAGNOSIS — N52.9 ERECTILE DYSFUNCTION, UNSPECIFIED ERECTILE DYSFUNCTION TYPE: ICD-10-CM

## 2020-10-19 RX ORDER — SILDENAFIL CITRATE 20 MG/1
TABLET ORAL
Qty: 30 TABLET | Refills: 3 | Status: SHIPPED | OUTPATIENT
Start: 2020-10-19 | End: 2021-02-14

## 2020-10-28 ENCOUNTER — APPOINTMENT (OUTPATIENT)
Dept: CT IMAGING | Facility: HOSPITAL | Age: 50
End: 2020-10-28

## 2020-10-28 ENCOUNTER — HOSPITAL ENCOUNTER (EMERGENCY)
Facility: HOSPITAL | Age: 50
Discharge: HOME | End: 2020-10-28
Attending: EMERGENCY MEDICINE | Admitting: EMERGENCY MEDICINE

## 2020-10-28 VITALS
HEIGHT: 72 IN | RESPIRATION RATE: 18 BRPM | OXYGEN SATURATION: 98 % | DIASTOLIC BLOOD PRESSURE: 103 MMHG | HEART RATE: 84 BPM | SYSTOLIC BLOOD PRESSURE: 163 MMHG | WEIGHT: 225 LBS | BODY MASS INDEX: 30.48 KG/M2 | TEMPERATURE: 98.5 F

## 2020-10-28 DIAGNOSIS — R51.9 NONINTRACTABLE EPISODIC HEADACHE, UNSPECIFIED HEADACHE TYPE: Primary | ICD-10-CM

## 2020-10-28 LAB
ALBUMIN SERPL-MCNC: 4.7 G/DL (ref 3.5–5.2)
ALBUMIN/GLOB SERPL: 1.9 G/DL
ALP SERPL-CCNC: 68 U/L (ref 39–117)
ALT SERPL W P-5'-P-CCNC: 137 U/L (ref 1–41)
ANION GAP SERPL CALCULATED.3IONS-SCNC: 11.3 MMOL/L (ref 5–15)
AST SERPL-CCNC: 91 U/L (ref 1–40)
BASOPHILS # BLD AUTO: 0.05 10*3/MM3 (ref 0–0.2)
BASOPHILS NFR BLD AUTO: 0.9 % (ref 0–1.5)
BILIRUB SERPL-MCNC: 0.4 MG/DL (ref 0–1.2)
BUN SERPL-MCNC: 11 MG/DL (ref 6–20)
BUN/CREAT SERPL: 10.8 (ref 7–25)
CALCIUM SPEC-SCNC: 9.9 MG/DL (ref 8.6–10.5)
CHLORIDE SERPL-SCNC: 104 MMOL/L (ref 98–107)
CLUMPED PLATELETS: PRESENT
CO2 SERPL-SCNC: 24.7 MMOL/L (ref 22–29)
CREAT SERPL-MCNC: 1.02 MG/DL (ref 0.76–1.27)
DEPRECATED RDW RBC AUTO: 50.4 FL (ref 37–54)
EOSINOPHIL # BLD AUTO: 0.05 10*3/MM3 (ref 0–0.4)
EOSINOPHIL NFR BLD AUTO: 0.9 % (ref 0.3–6.2)
ERYTHROCYTE [DISTWIDTH] IN BLOOD BY AUTOMATED COUNT: 13.9 % (ref 12.3–15.4)
GFR SERPL CREATININE-BSD FRML MDRD: 77 ML/MIN/1.73
GLOBULIN UR ELPH-MCNC: 2.5 GM/DL
GLUCOSE SERPL-MCNC: 101 MG/DL (ref 65–99)
HCT VFR BLD AUTO: 51.1 % (ref 37.5–51)
HGB BLD-MCNC: 17 G/DL (ref 13–17.7)
IMM GRANULOCYTES # BLD AUTO: 0.02 10*3/MM3 (ref 0–0.05)
IMM GRANULOCYTES NFR BLD AUTO: 0.4 % (ref 0–0.5)
INR PPP: 2.25 (ref 0.9–1.1)
LYMPHOCYTES # BLD AUTO: 1.39 10*3/MM3 (ref 0.7–3.1)
LYMPHOCYTES NFR BLD AUTO: 26.3 % (ref 19.6–45.3)
MCH RBC QN AUTO: 33 PG (ref 26.6–33)
MCHC RBC AUTO-ENTMCNC: 33.3 G/DL (ref 31.5–35.7)
MCV RBC AUTO: 99.2 FL (ref 79–97)
MONOCYTES # BLD AUTO: 0.51 10*3/MM3 (ref 0.1–0.9)
MONOCYTES NFR BLD AUTO: 9.7 % (ref 5–12)
NEUTROPHILS NFR BLD AUTO: 3.26 10*3/MM3 (ref 1.7–7)
NEUTROPHILS NFR BLD AUTO: 61.8 % (ref 42.7–76)
NRBC BLD AUTO-RTO: 0 /100 WBC (ref 0–0.2)
PLATELET # BLD AUTO: 134 10*3/MM3 (ref 140–450)
PMV BLD AUTO: 9.4 FL (ref 6–12)
POTASSIUM SERPL-SCNC: 3.8 MMOL/L (ref 3.5–5.2)
PROT SERPL-MCNC: 7.2 G/DL (ref 6–8.5)
PROTHROMBIN TIME: 26.3 SECONDS (ref 12–15.1)
RBC # BLD AUTO: 5.15 10*6/MM3 (ref 4.14–5.8)
RBC MORPH BLD: NORMAL
RBC MORPH BLD: NORMAL
SMALL PLATELETS BLD QL SMEAR: NORMAL
SODIUM SERPL-SCNC: 140 MMOL/L (ref 136–145)
WBC # BLD AUTO: 5.28 10*3/MM3 (ref 3.4–10.8)
WBC MORPH BLD: NORMAL
WBC MORPH BLD: NORMAL

## 2020-10-28 PROCEDURE — 85025 COMPLETE CBC W/AUTO DIFF WBC: CPT | Performed by: NURSE PRACTITIONER

## 2020-10-28 PROCEDURE — 80053 COMPREHEN METABOLIC PANEL: CPT | Performed by: NURSE PRACTITIONER

## 2020-10-28 PROCEDURE — 85610 PROTHROMBIN TIME: CPT | Performed by: NURSE PRACTITIONER

## 2020-10-28 PROCEDURE — 70450 CT HEAD/BRAIN W/O DYE: CPT

## 2020-10-28 PROCEDURE — 99283 EMERGENCY DEPT VISIT LOW MDM: CPT

## 2020-10-28 PROCEDURE — 85007 BL SMEAR W/DIFF WBC COUNT: CPT | Performed by: NURSE PRACTITIONER

## 2020-10-28 RX ORDER — BUTALBITAL, ACETAMINOPHEN AND CAFFEINE 50; 325; 40 MG/1; MG/1; MG/1
1 TABLET ORAL EVERY 6 HOURS PRN
Qty: 12 TABLET | Refills: 0 | Status: SHIPPED | OUTPATIENT
Start: 2020-10-28 | End: 2021-09-07

## 2020-10-28 RX ORDER — AZITHROMYCIN 250 MG/1
250 TABLET, FILM COATED ORAL DAILY
COMMUNITY
Start: 2020-10-23 | End: 2020-10-28

## 2020-12-15 ENCOUNTER — OFFICE VISIT (OUTPATIENT)
Dept: UROLOGY | Facility: CLINIC | Age: 50
End: 2020-12-15

## 2020-12-15 VITALS
DIASTOLIC BLOOD PRESSURE: 98 MMHG | WEIGHT: 225 LBS | SYSTOLIC BLOOD PRESSURE: 160 MMHG | RESPIRATION RATE: 17 BRPM | HEIGHT: 72 IN | OXYGEN SATURATION: 98 % | HEART RATE: 116 BPM | TEMPERATURE: 97.6 F | BODY MASS INDEX: 30.48 KG/M2

## 2020-12-15 DIAGNOSIS — E29.1 HYPOGONADISM MALE: ICD-10-CM

## 2020-12-15 DIAGNOSIS — N28.1 RENAL CYST: Primary | ICD-10-CM

## 2020-12-15 DIAGNOSIS — N52.9 ERECTILE DYSFUNCTION, UNSPECIFIED ERECTILE DYSFUNCTION TYPE: ICD-10-CM

## 2020-12-15 PROCEDURE — 81003 URINALYSIS AUTO W/O SCOPE: CPT | Performed by: UROLOGY

## 2020-12-15 PROCEDURE — 99214 OFFICE O/P EST MOD 30 MIN: CPT | Performed by: UROLOGY

## 2020-12-15 NOTE — PROGRESS NOTES
Chief Complaint  renal cyst (6 month follow up, )        POPPY Le is a 50 y.o. male who returns today for follow-up of multiple urological concerns.  He has hypogonadism is taking testosterone and is followed closely elsewhere so does not require my input on that.  He does request a prolactin level states it has never been checked.  His primary concern today is actually erectile dysfunction and in his description it is extremely variable and associated with performance anxiety.  He has Viagra already and he is informed about the other options including intracorporal injection with Trimix and intraurethral injection of gel.  He takes Coumadin because of his history of blood clots.  He is asking about an ultrasound to check the blood flow to his penis.    A total unrelated subject are cyst in the kidneys that require follow-up with an ultrasound and this is ordered today.    He has a past history of urinary retention based primarily on a distended bladder noted on CT scan.  Several follow-up bladder studies have revealed no significant postvoid residual and he denies difficulty emptying today.  Vitals:    12/15/20 1054   BP: 160/98   Pulse: 116   Resp: 17   Temp: 97.6 °F (36.4 °C)   SpO2: 98%       Past Medical History  Past Medical History:   Diagnosis Date   • Arthritis    • Deep vein thrombosis (CMS/HCC)    • Hemochromatosis    • Hemochromatosis    • Hypertension    • Migraine    • Sleep apnea    • Spine pain        Past Surgical History  Past Surgical History:   Procedure Laterality Date   • CERVICAL FUSION     • COLONOSCOPY     • EYE SURGERY      LASIX   • HAND SURGERY     • SPINAL FUSION     • WRIST SURGERY         Medications  has a current medication list which includes the following prescription(s): acetaminophen, alprazolam, amoxicillin, butalbital-acetaminophen-caffeine, gabapentin, gavilyte-g, losartan, ondansetron odt, oseltamivir, sertraline, sildenafil, testosterone cypionate, valacyclovir,  warfarin, warfarin, and warfarin.      Allergies  No Known Allergies    Social History  Social History     Socioeconomic History   • Marital status:      Spouse name: Not on file   • Number of children: Not on file   • Years of education: Not on file   • Highest education level: Not on file   Tobacco Use   • Smoking status: Never Smoker   • Smokeless tobacco: Never Used   Substance and Sexual Activity   • Alcohol use: No   • Drug use: No   • Sexual activity: Defer       Family History  He has no family history of bladder or kidney cancer  He has no family history of kidney stones      AUA Symptom Score:      Review of Systems  Review of Systems   Constitutional: Negative for activity change, appetite change, chills, fatigue, fever, unexpected weight gain and unexpected weight loss.   Respiratory: Negative for apnea, cough, chest tightness, shortness of breath, wheezing and stridor.    Cardiovascular: Negative for chest pain, palpitations and leg swelling.   Gastrointestinal: Negative for abdominal distention, abdominal pain, anal bleeding, blood in stool, constipation, diarrhea, nausea, rectal pain, vomiting, GERD and indigestion.   Genitourinary: Positive for erectile dysfunction. Negative for decreased libido, decreased urine volume, difficulty urinating, discharge, dysuria, flank pain, frequency, genital sores, hematuria, nocturia, penile pain, penile swelling, scrotal swelling, testicular pain, urgency and urinary incontinence.   Musculoskeletal: Negative for back pain and joint swelling.   Neurological: Negative for tremors, seizures, speech difficulty, weakness and numbness.   Psychiatric/Behavioral: Negative for agitation, decreased concentration, sleep disturbance, depressed mood and stress. The patient is not nervous/anxious.        Physical Exam  Physical Exam  Vitals signs reviewed.   Constitutional:       Appearance: He is well-developed.   HENT:      Head: Normocephalic and atraumatic.   Neck:       Musculoskeletal: Normal range of motion.   Pulmonary:      Effort: Pulmonary effort is normal. No respiratory distress.   Abdominal:      General: There is no distension.      Palpations: Abdomen is soft. There is no mass.      Tenderness: There is no abdominal tenderness.      Hernia: No hernia is present.   Musculoskeletal: Normal range of motion.   Lymphadenopathy:      Cervical: No cervical adenopathy.   Skin:     General: Skin is warm and dry.   Neurological:      Mental Status: He is alert and oriented to person, place, and time.   Psychiatric:         Behavior: Behavior normal.         Labs Recent and today in the office:  Results for orders placed or performed in visit on 12/15/20   POC Urinalysis Dipstick, Automated    Specimen: Urine   Result Value Ref Range    Color Yellow Yellow, Straw, Dark Yellow, Marybeth    Clarity, UA Clear Clear    Specific Gravity  1.015 1.005 - 1.030    pH, Urine 6.0 5.0 - 8.0    Leukocytes Negative Negative    Nitrite, UA Negative Negative    Protein, POC Negative Negative mg/dL    Glucose, UA Negative Negative, 1000 mg/dL (3+) mg/dL    Ketones, UA Negative Negative    Urobilinogen, UA Normal Normal    Bilirubin Negative Negative    Blood, UA Negative Negative         Assessment & Plan  Hypogonadism/erectile dysfunction: The first is managed elsewhere but he does request a prolactin level and that is drawn today.    The patient was informed that the most common cause of erectile dysfunction is insufficient blood flow.  Atherosclerosis is extremely common in Americans who eat a diet high in animal fat and get insufficient exercise.  Patients who have hyperlipidemia and smoke cigarettes compound this affect.  The concept of diminished inflow from arterial sclerosis as well as diminished effectiveness of venous polsters that retain the blood in the cavernous spaces are presented to the patient.  The need for smoking cessation and eating a heart healthy diet along with increased  physical activity is recommended in addition to the specific treatments for erectile dysfunction and hyperlipidemia.    He states he will read the literature provided concerning intraurethral gel and will call back if he wants a prescription.    Renal lesion: History of renal cyst that require follow-up so an ultrasound is ordered at his request rather than CT scan.

## 2020-12-16 LAB — PROLACTIN SERPL-MCNC: 8.7 NG/ML (ref 4–15.2)

## 2020-12-23 ENCOUNTER — HOSPITAL ENCOUNTER (OUTPATIENT)
Dept: ULTRASOUND IMAGING | Facility: HOSPITAL | Age: 50
Discharge: HOME | End: 2020-12-23
Admitting: UROLOGY

## 2020-12-23 PROCEDURE — 76775 US EXAM ABDO BACK WALL LIM: CPT

## 2021-01-14 ENCOUNTER — TRANSCRIBE ORDERS (OUTPATIENT)
Dept: ADMINISTRATIVE | Facility: HOSPITAL | Age: 51
End: 2021-01-14

## 2021-01-14 DIAGNOSIS — R59.1 LYMPHADENOPATHY SYNDROME: Primary | ICD-10-CM

## 2021-01-25 ENCOUNTER — TRANSCRIBE ORDERS (OUTPATIENT)
Dept: ADMINISTRATIVE | Facility: HOSPITAL | Age: 51
End: 2021-01-25

## 2021-01-25 ENCOUNTER — HOSPITAL ENCOUNTER (OUTPATIENT)
Dept: GENERAL RADIOLOGY | Facility: HOSPITAL | Age: 51
Discharge: HOME | End: 2021-01-25
Admitting: INTERNAL MEDICINE

## 2021-01-25 DIAGNOSIS — U07.1 CLINICAL DIAGNOSIS OF SEVERE ACUTE RESPIRATORY SYNDROME CORONAVIRUS 2 (SARS-COV-2) DISEASE: ICD-10-CM

## 2021-01-25 DIAGNOSIS — U07.1 CLINICAL DIAGNOSIS OF SEVERE ACUTE RESPIRATORY SYNDROME CORONAVIRUS 2 (SARS-COV-2) DISEASE: Primary | ICD-10-CM

## 2021-01-25 PROCEDURE — 71046 X-RAY EXAM CHEST 2 VIEWS: CPT

## 2021-01-26 ENCOUNTER — HOSPITAL ENCOUNTER (OUTPATIENT)
Dept: ULTRASOUND IMAGING | Facility: HOSPITAL | Age: 51
Discharge: HOME | End: 2021-01-26
Admitting: INTERNAL MEDICINE

## 2021-01-26 DIAGNOSIS — R59.1 LYMPHADENOPATHY SYNDROME: ICD-10-CM

## 2021-01-26 PROCEDURE — 76857 US EXAM PELVIC LIMITED: CPT

## 2021-01-27 ENCOUNTER — APPOINTMENT (OUTPATIENT)
Dept: ULTRASOUND IMAGING | Facility: HOSPITAL | Age: 51
End: 2021-01-27

## 2021-02-12 ENCOUNTER — HOSPITAL ENCOUNTER (EMERGENCY)
Facility: HOSPITAL | Age: 51
Discharge: HOME | End: 2021-02-12
Attending: EMERGENCY MEDICINE | Admitting: EMERGENCY MEDICINE

## 2021-02-12 ENCOUNTER — APPOINTMENT (OUTPATIENT)
Dept: CT IMAGING | Facility: HOSPITAL | Age: 51
End: 2021-02-12

## 2021-02-12 ENCOUNTER — APPOINTMENT (OUTPATIENT)
Dept: GENERAL RADIOLOGY | Facility: HOSPITAL | Age: 51
End: 2021-02-12

## 2021-02-12 VITALS
SYSTOLIC BLOOD PRESSURE: 142 MMHG | BODY MASS INDEX: 30.2 KG/M2 | TEMPERATURE: 98.9 F | HEIGHT: 72 IN | OXYGEN SATURATION: 95 % | WEIGHT: 223 LBS | DIASTOLIC BLOOD PRESSURE: 98 MMHG | HEART RATE: 109 BPM | RESPIRATION RATE: 16 BRPM

## 2021-02-12 DIAGNOSIS — R79.1 SUPRATHERAPEUTIC INR: ICD-10-CM

## 2021-02-12 DIAGNOSIS — N52.9 ERECTILE DYSFUNCTION, UNSPECIFIED ERECTILE DYSFUNCTION TYPE: ICD-10-CM
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
## 2021-02-12 DIAGNOSIS — H11.32 SUBCONJUNCTIVAL HEMORRHAGE OF LEFT EYE: Primary | ICD-10-CM

## 2021-02-12 LAB
INR PPP: 4.23 (ref 0.9–1.1)
PROTHROMBIN TIME: 43.9 SECONDS (ref 12–15.1)

## 2021-02-12 PROCEDURE — 70450 CT HEAD/BRAIN W/O DYE: CPT

## 2021-02-12 PROCEDURE — 73030 X-RAY EXAM OF SHOULDER: CPT

## 2021-02-12 PROCEDURE — 99283 EMERGENCY DEPT VISIT LOW MDM: CPT

## 2021-02-12 PROCEDURE — 85610 PROTHROMBIN TIME: CPT | Performed by: EMERGENCY MEDICINE

## 2021-02-12 PROCEDURE — 99282 EMERGENCY DEPT VISIT SF MDM: CPT

## 2021-02-14 RX ORDER — SILDENAFIL CITRATE 20 MG/1
TABLET ORAL
Qty: 30 TABLET | Refills: 3 | Status: SHIPPED | OUTPATIENT
Start: 2021-02-14 | End: 2021-12-29

## 2021-02-15 NOTE — ED PROVIDER NOTES
Subjective   History of Present Illness    Chief Complaint: Fall, bleeding left eye  History of Present Illness: 50-year-old male history of chronic anticoagulation with Coumadin for DVTs.  Had a fall yesterday.  Awoke this morning with subconjunctival hemorrhage.  Patient is concerned about intracranial hemorrhage.  Had his blood level checked few weeks ago which was in range at that time  Onset: Fall yesterday, did hit his head, noticed reddened area the left eye today  Duration: Single inciting event  Exacerbating / Alleviating factors: None  Associated symptoms: None      Nurses Notes reviewed and agree, including vitals, allergies, social history and prior medical history.     REVIEW OF SYSTEMS: All systems reviewed and not pertinent unless noted.    Positive for: Fall bleeding area to the left eye, mild headache after fall    Negative for: LOC vomiting confusion focal weakness numbness vision loss neck pain back pain chest pain abdominal pain extremity  Past Medical History:   Diagnosis Date   • Arthritis    • Deep vein thrombosis (CMS/HCC)    • Hemochromatosis    • Hemochromatosis    • Hypertension    • Migraine    • Sleep apnea    • Spine pain        No Known Allergies    Past Surgical History:   Procedure Laterality Date   • CERVICAL FUSION     • COLONOSCOPY     • EYE SURGERY      LASIX   • HAND SURGERY     • SPINAL FUSION     • WRIST SURGERY         Family History   Family history unknown: Yes   Problem Relation Age of Onset   • Family history unknown: Yes   • Arthritis Mother    • Hypertension Mother    • Hyperlipidemia Mother    • Arthritis Father    • Cancer Father    • Hypertension Father        Social History     Socioeconomic History   • Marital status:      Spouse name: Not on file   • Number of children: Not on file   • Years of education: Not on file   • Highest education level: Not on file   Tobacco Use   • Smoking status: Never Smoker   • Smokeless tobacco: Never Used   Substance and  Sexual Activity   • Alcohol use: No   • Drug use: No   • Sexual activity: Defer           Objective   Physical Exam    GENERAL APPEARANCE: Well developed, healthy-appearing 50-year-old white male,  in no acute distress.  VITAL SIGNS: per nursing, reviewed and noted  SKIN: exposed skin with no rashes, ulcerations or petechiae.  Head: Normocephalic, atraumatic.   EYES: perrla. EOMI. superior subconjunctival hemorrhage proximately 15% left eye.  ENT: Normal voice.  Patient maintained wearing a mask throughout patient encounter due to coronavirus pandemic  LUNGS:  No increased work of breathing. No retractions.   CARDIOVASCULAR:  regular rate and rhythm, no murmurs.  Good Peripheral pulses. Good cap refill to extremities.   ABDOMEN: Soft, nontender, normal bowel sounds. No hernia. No ascites.  MUSCULOSKELETAL:  No tenderness. Full ROM. Strength and tone normal.  NEUROLOGIC: Alert, oriented x 3. No gross deficits. GCS 15.   NECK: Supple, symmetric. No tenderness, no masses. Full ROM  Back: full rom, no paraspinal spasm. No CVA tenderness.   PSYCH: appropriate affect.  : no bladder tenderness or distention, no CVA tenderness      Procedures     No attending physician procedures were performed on this patient.      ED Course  ED Course as of Feb 15 1813   Fri Feb 12, 2021   1006 INR(!!): 4.23 [PF]   1006 Protime(!): 43.9 [PF]   1007 PROCEDURE: CT HEAD WO CONTRAST-     HISTORY: fall, anticoagulated     COMPARISON: October 28, 2020.     TECHNIQUE: Multiple axial CT images were performed from the foramen  magnum to the vertex without enhancement.      FINDINGS: There is no CT evidence of hemorrhage. There is no mass, mass  effect or midline shift.  There is no hydrocephalus. The paranasal  sinuses are clear. Bone windows reveal no acute osseous abnormalities.     IMPRESSION:  No acute intracranial process.    [PF]   1007 PROCEDURE: XR SHOULDER 2+ VW RIGHT-     History: pain fall     COMPARISON: None.     FINDINGS:  A 3 view  exam demonstrates no acute fracture or dislocation.  Degenerative changes are present at the acromioclavicular joint. No soft  tissue abnormality is seen.     IMPRESSION:  No acute fracture.               This report was finalized on 2/12/2021 8:45 AM by Zachary Villarreal M.D..    [PF]      ED Course User Index  [PF] Mino Holbrook,                                            [facility]  Patient is found to have supratherapeutic INR.  No evidence of acute intracranial findings.  Advised to hold his next 2 doses and then resume his Coumadin at lower dose.  Outpatient follow-up with primary care physician for recheck.  Advised supportive care regarding his subconjunctival hemorrhage.  Return precautions were discussed.  Final diagnoses:   Subconjunctival hemorrhage of left eye   Fall, initial encounter   Supratherapeutic INR            Mino Holbrook DO  02/15/21 7180

## 2021-02-23 ENCOUNTER — OFFICE VISIT (OUTPATIENT)
Dept: SURGERY | Facility: CLINIC | Age: 51
End: 2021-02-23

## 2021-02-23 VITALS
RESPIRATION RATE: 16 BRPM | HEART RATE: 94 BPM | OXYGEN SATURATION: 98 % | HEIGHT: 72 IN | WEIGHT: 225 LBS | BODY MASS INDEX: 30.48 KG/M2 | DIASTOLIC BLOOD PRESSURE: 98 MMHG | SYSTOLIC BLOOD PRESSURE: 158 MMHG | TEMPERATURE: 97 F

## 2021-02-23 DIAGNOSIS — R59.9 ENLARGED LYMPH NODE: Primary | ICD-10-CM

## 2021-02-23 PROCEDURE — 99243 OFF/OP CNSLTJ NEW/EST LOW 30: CPT | Performed by: SURGERY

## 2021-02-23 PROCEDURE — 10005 FNA BX W/US GDN 1ST LES: CPT | Performed by: SURGERY

## 2021-02-23 RX ORDER — DOXYCYCLINE HYCLATE 100 MG/1
100 CAPSULE ORAL 2 TIMES DAILY
COMMUNITY
Start: 2021-01-18

## 2021-02-23 RX ORDER — METRONIDAZOLE 10 MG/G
GEL TOPICAL
COMMUNITY
End: 2021-09-07

## 2021-02-23 NOTE — PROGRESS NOTES
"Patient: Charli Le    YOB: 1970    Date: 02/23/2021    Primary Care Provider: Sriram Valdes MD    Chief Complaint   Patient presents with   • Swollen Glands     left inguinal area.       SUBJECTIVE:    History of present illness:  Patient complains of palpable \"lymph nodes\" in his left inguinal area which have been present for @ 6 weeks.  Patient had ultrasound of the pelvis performed 01/26/2021 which showed mildly prominent left inguinal lymph nodes.    Apparently this is been present over the past 6 weeks.  This does cause him some dull discomfort in the left groin associated with some palpable nodules, nonradiating in nature, nothing seems to make it worse or better.    He also has a history significant for lumbar disc disease and is due to see Dr. Abrams of the neurosurgery service next week.    The following portions of the patient's history were reviewed and updated as appropriate: allergies, current medications, past family history, past medical history, past social history, past surgical history and problem list.      Review of Systems   Constitutional: Negative for chills, fever and unexpected weight change.   HENT: Negative for trouble swallowing and voice change.    Eyes: Negative for visual disturbance.   Respiratory: Negative for apnea, cough, chest tightness, shortness of breath and wheezing.    Cardiovascular: Negative for chest pain, palpitations and leg swelling.   Gastrointestinal: Negative for abdominal distention, abdominal pain, anal bleeding, blood in stool, constipation, diarrhea, nausea, rectal pain and vomiting.   Endocrine: Negative for cold intolerance and heat intolerance.   Genitourinary: Negative for difficulty urinating, dysuria, flank pain, scrotal swelling and testicular pain.   Musculoskeletal: Negative for back pain, gait problem and joint swelling.   Skin: Negative for color change, rash and wound.   Neurological: Negative for dizziness, syncope, " speech difficulty, weakness, numbness and headaches.   Hematological: Negative for adenopathy. Does not bruise/bleed easily.   Psychiatric/Behavioral: Negative for confusion. The patient is not nervous/anxious.        Allergies:  No Known Allergies    Medications:    Current Outpatient Medications:   •  acetaminophen (TYLENOL 8 HOUR) 650 MG 8 hr tablet, Take 1 tablet by mouth Every 8 (Eight) Hours As Needed for Mild Pain ., Disp: 12 tablet, Rfl: 0  •  ALPRAZolam (XANAX) 1 MG tablet, Tid prn, Disp: , Rfl: 0  •  butalbital-acetaminophen-caffeine (FIORICET, ESGIC) -40 MG per tablet, Take 1 tablet by mouth Every 6 (Six) Hours As Needed for Headache., Disp: 12 tablet, Rfl: 0  •  doxycycline (VIBRAMYCIN) 100 MG capsule, Take 100 mg by mouth 2 (Two) Times a Day., Disp: , Rfl:   •  gabapentin (NEURONTIN) 300 MG capsule, Tid takes it bid, Disp: , Rfl: 1  •  GAVILYTE-G 236 g solution, take as directed, Disp: , Rfl: 0  •  losartan (COZAAR) 50 MG tablet, , Disp: , Rfl: 0  •  metroNIDAZOLE (METROGEL) 1 % gel, metronidazole 1 % topical gel  prn, Disp: , Rfl:   •  ondansetron ODT (ZOFRAN-ODT) 4 MG disintegrating tablet, Take 1 tablet by mouth Every 8 (Eight) Hours As Needed for Nausea or Vomiting., Disp: 15 tablet, Rfl: 0  •  oseltamivir (TAMIFLU) 75 MG capsule, 1 po bid for 5 days, Disp: 10 capsule, Rfl: 0  •  sertraline (ZOLOFT) 100 MG tablet, , Disp: , Rfl: 1  •  sildenafil (REVATIO) 20 MG tablet, TAKE 1 TO 3 TABLETS DAILY AS NEEDED FOR ERECTILE DYSFUNCTION, Disp: 30 tablet, Rfl: 3  •  Testosterone Cypionate (DEPOTESTOTERONE CYPIONATE) 200 MG/ML injection, inject 1/2 milliliter every 2 weeks, Disp: , Rfl: 0  •  valACYclovir (VALTREX) 1000 MG tablet, Take 1 tablet by mouth Daily., Disp: 20 tablet, Rfl: 0  •  warfarin (COUMADIN) 5 MG tablet, , Disp: , Rfl: 0  •  warfarin (COUMADIN) 7.5 MG tablet, warfarin 7.5 mg tablet  Take 1 tablet every day by oral route., Disp: , Rfl:   •  amoxicillin (AMOXIL) 875 MG tablet, 1 po bid  "for 10 days, Disp: 20 tablet, Rfl: 0    History:  Past Medical History:   Diagnosis Date   • Arthritis    • Deep vein thrombosis (CMS/HCC)    • Hemochromatosis    • Hemochromatosis    • Hypertension    • Migraine    • Sleep apnea    • Spine pain        Past Surgical History:   Procedure Laterality Date   • CERVICAL FUSION     • COLONOSCOPY     • EYE SURGERY      LASIX   • HAND SURGERY     • SPINAL FUSION     • WRIST SURGERY         Family History   Family history unknown: Yes   Problem Relation Age of Onset   • Family history unknown: Yes   • Arthritis Mother    • Hypertension Mother    • Hyperlipidemia Mother    • Arthritis Father    • Cancer Father    • Hypertension Father        Social History     Tobacco Use   • Smoking status: Never Smoker   • Smokeless tobacco: Never Used   Substance Use Topics   • Alcohol use: No   • Drug use: No        OBJECTIVE:    Vital Signs:   Vitals:    02/23/21 1237   BP: 158/98   Pulse: 94   Resp: 16   Temp: 97 °F (36.1 °C)   TempSrc: Temporal   SpO2: 98%   Weight: 102 kg (225 lb)   Height: 182.9 cm (72\")       Physical Exam:   General Appearance:    Alert, cooperative, in no acute distress   Head:    Normocephalic, without obvious abnormality, atraumatic   Eyes:            Lids and lashes normal, conjunctivae and sclerae normal, no   icterus, no pallor, corneas clear, PERRLA   Ears:    Ears appear intact with no abnormalities noted   Throat:   No oral lesions, no thrush, oral mucosa moist   Neck:   No adenopathy, supple, trachea midline, no thyromegaly, no   carotid bruit, no JVD   Lungs:     Clear to auscultation,respirations regular, even and                  unlabored    Heart:    Regular rhythm and normal rate, normal S1 and S2, no            murmur, no gallop, no rub, no click   Chest Wall:    No abnormalities observed   Abdomen:     Normal bowel sounds, no masses, no organomegaly, soft        non-tender, non-distended, no guarding, no rebound                tenderness "   Extremities:   Moves all extremities well, no edema, no cyanosis, no             redness   Pulses:   Pulses palpable and equal bilaterally   Skin:   No bleeding, bruising or rash, slight lymph nodes palpable in the left groin   Lymph nodes:   No palpable adenopathy   Neurologic:   Cranial nerves 2 - 12 grossly intact, sensation intact, DTR       present and equal bilaterally     Results Review:   I reviewed the patient's new clinical results.  I reviewed the patient's new imaging results and agree with the interpretation.  I reviewed the patient's other test results and agree with the interpretation    Review of Systems was reviewed and confirmed as accurate as documented by the MA.    ASSESSMENT/PLAN:    1. Enlarged lymph node        Procedure:    I recommend a FNA of the left groin lesion. The procedure and risks were clearly explained including bleeding, infection and requirement for re-biopsy and the patient understood these and wishes to proceed.    The patient was brought to the procedure room. Consent and time out were performed. The area was prepped and draped in the usual fashion. 1% lidocaine with epinephrine was infused locally. A 20G needle was used to biopsy the lesion with ultrasound guidance.  Minimal blood loss had occurred and hemostasis had been well controlled with pressure.  The patient tolerated the procedure and there were no complications. We will make a f/u appointment to discuss results.      I have told the patient that these small lymph nodes are most likely asymptomatic, he is on chronic doxycycline for rosacea and I have asked him to keep this previously prescribed prescription.    I discussed the patients findings and my recommendations with patient        Electronically signed by Ramiro Vieyra MD  02/23/21

## 2021-03-01 ENCOUNTER — TELEPHONE (OUTPATIENT)
Dept: SURGERY | Facility: CLINIC | Age: 51
End: 2021-03-01

## 2021-03-23 NOTE — PROGRESS NOTES
Patient: Charli Le  YOB: 1970    Date: 03/24/2021    Primary Care Provider: Sriram Valdes MD    Chief Complaint   Patient presents with   • Follow-up     left inguinal lymphadenopathy       History: Patient is here for an evaluation and follow up from his previous left groin fine needle aspiration. Pathology did show nondiagnostic specimen. Patient states he is doing well and has no complaints.     Apparently the patient did have recent evaluation per Dr. Abrams of Neurosurgery, he has upcoming right shoulder surgery for torn rotator cuff.    The following portions of the patient's history were reviewed and updated as appropriate: allergies, current medications, past family history, past medical history, past social history, past surgical history and problem list.    Review of Systems   Constitutional: Negative for chills, fever and unexpected weight change.   HENT: Negative for trouble swallowing and voice change.    Eyes: Negative for visual disturbance.   Respiratory: Negative for apnea, cough, chest tightness, shortness of breath and wheezing.    Cardiovascular: Negative for chest pain, palpitations and leg swelling.   Gastrointestinal: Negative for abdominal distention, abdominal pain, anal bleeding, blood in stool, constipation, diarrhea, nausea, rectal pain and vomiting.   Endocrine: Negative for cold intolerance and heat intolerance.   Genitourinary: Negative for difficulty urinating, dysuria, flank pain, scrotal swelling and testicular pain.   Musculoskeletal: Negative for back pain, gait problem and joint swelling.   Skin: Negative for color change, rash and wound.   Neurological: Negative for dizziness, syncope, speech difficulty, weakness, numbness and headaches.   Hematological: Negative for adenopathy. Does not bruise/bleed easily.   Psychiatric/Behavioral: Negative for confusion. The patient is not nervous/anxious.        Vital Signs  Vitals:    03/24/21 1427   BP:  "140/98   Pulse: 93   Temp: 98.6 °F (37 °C)   TempSrc: Temporal   SpO2: 99%   Weight: 107 kg (236 lb)   Height: 182.9 cm (72.01\")       Allergies:  No Known Allergies    Medications:    Current Outpatient Medications:   •  acetaminophen (TYLENOL 8 HOUR) 650 MG 8 hr tablet, Take 1 tablet by mouth Every 8 (Eight) Hours As Needed for Mild Pain ., Disp: 12 tablet, Rfl: 0  •  ALPRAZolam (XANAX) 1 MG tablet, Tid prn, Disp: , Rfl: 0  •  amoxicillin (AMOXIL) 875 MG tablet, 1 po bid for 10 days, Disp: 20 tablet, Rfl: 0  •  butalbital-acetaminophen-caffeine (FIORICET, ESGIC) -40 MG per tablet, Take 1 tablet by mouth Every 6 (Six) Hours As Needed for Headache., Disp: 12 tablet, Rfl: 0  •  doxycycline (VIBRAMYCIN) 100 MG capsule, Take 100 mg by mouth 2 (Two) Times a Day., Disp: , Rfl:   •  gabapentin (NEURONTIN) 300 MG capsule, Tid takes it bid, Disp: , Rfl: 1  •  GAVILYTE-G 236 g solution, take as directed, Disp: , Rfl: 0  •  losartan (COZAAR) 50 MG tablet, , Disp: , Rfl: 0  •  metroNIDAZOLE (METROGEL) 1 % gel, metronidazole 1 % topical gel  prn, Disp: , Rfl:   •  ondansetron ODT (ZOFRAN-ODT) 4 MG disintegrating tablet, Take 1 tablet by mouth Every 8 (Eight) Hours As Needed for Nausea or Vomiting., Disp: 15 tablet, Rfl: 0  •  oseltamivir (TAMIFLU) 75 MG capsule, 1 po bid for 5 days, Disp: 10 capsule, Rfl: 0  •  sertraline (ZOLOFT) 100 MG tablet, , Disp: , Rfl: 1  •  sildenafil (REVATIO) 20 MG tablet, TAKE 1 TO 3 TABLETS DAILY AS NEEDED FOR ERECTILE DYSFUNCTION, Disp: 30 tablet, Rfl: 3  •  Testosterone Cypionate (DEPOTESTOTERONE CYPIONATE) 200 MG/ML injection, inject 1/2 milliliter every 2 weeks, Disp: , Rfl: 0  •  valACYclovir (VALTREX) 1000 MG tablet, Take 1 tablet by mouth Daily., Disp: 20 tablet, Rfl: 0  •  warfarin (COUMADIN) 5 MG tablet, , Disp: , Rfl: 0  •  warfarin (COUMADIN) 7.5 MG tablet, warfarin 7.5 mg tablet  Take 1 tablet every day by oral route., Disp: , Rfl:     Physical Exam:   General Appearance:    " Alert, cooperative, in no acute distress   Head:    Normocephalic, without obvious abnormality, atraumatic   Lungs:     Clear to auscultation,respirations regular, even and                  unlabored    Heart:    Regular rhythm and normal rate, normal S1 and S2, no            murmur, no gallop, no rub, no click   Abdomen:     Normal bowel sounds, no masses, no organomegaly, soft        non-tender, non-distended, no guarding, no rebound                tenderness   Extremities:   Moves all extremities well, no edema, no cyanosis, no             redness   Pulses:   Pulses palpable and equal bilaterally   Skin:   No bleeding, bruising or rash, no palpable lesion left groin      Results Review:   I reviewed the patient's new clinical results.  I reviewed the patient's new imaging results and agree with the interpretation.  I reviewed the patient's other test results and agree with the interpretation     Review of Systems was reviewed and confirmed as accurate as documented by the MA.    ASSESSMENT/PLAN:    1. Enlarged lymph node       I did have a detailed and extensive discussion with the patient in the office today.  Ultrasound was performed and showed diminishment in size of these left inguinal lymph nodes, I do not think the patient needs any further surgical intervention.  Previous pathology report was reviewed with him, he knows to see me back in the office if he has any further problems.  There were no questions for me at the end of the discussion.    Electronically signed by Ramiro Vieyra MD  03/24/21    Portions of this note have been scribed for Ramiro Vieyra MD by Michelle Milligan. 3/24/2021  14:48 EDT

## 2021-03-24 ENCOUNTER — OFFICE VISIT (OUTPATIENT)
Dept: SURGERY | Facility: CLINIC | Age: 51
End: 2021-03-24

## 2021-03-24 VITALS
WEIGHT: 236 LBS | OXYGEN SATURATION: 99 % | TEMPERATURE: 98.6 F | BODY MASS INDEX: 31.97 KG/M2 | HEIGHT: 72 IN | DIASTOLIC BLOOD PRESSURE: 98 MMHG | HEART RATE: 93 BPM | SYSTOLIC BLOOD PRESSURE: 140 MMHG

## 2021-03-24 DIAGNOSIS — R59.9 ENLARGED LYMPH NODE: Primary | ICD-10-CM

## 2021-03-24 PROCEDURE — 99213 OFFICE O/P EST LOW 20 MIN: CPT | Performed by: SURGERY

## 2021-04-07 ENCOUNTER — HOSPITAL ENCOUNTER (OUTPATIENT)
Dept: GENERAL RADIOLOGY | Facility: HOSPITAL | Age: 51
Discharge: HOME | End: 2021-04-07

## 2021-04-07 ENCOUNTER — PRE-ADMISSION TESTING (OUTPATIENT)
Dept: PREADMISSION TESTING | Facility: HOSPITAL | Age: 51
End: 2021-04-07

## 2021-04-07 LAB
ANION GAP SERPL CALCULATED.3IONS-SCNC: 10 MMOL/L (ref 5–15)
BUN SERPL-MCNC: 14 MG/DL (ref 6–20)
BUN/CREAT SERPL: 14.1 (ref 7–25)
CALCIUM SPEC-SCNC: 9.4 MG/DL (ref 8.6–10.5)
CHLORIDE SERPL-SCNC: 105 MMOL/L (ref 98–107)
CO2 SERPL-SCNC: 24 MMOL/L (ref 22–29)
CREAT SERPL-MCNC: 0.99 MG/DL (ref 0.76–1.27)
DEPRECATED RDW RBC AUTO: 45.9 FL (ref 37–54)
ERYTHROCYTE [DISTWIDTH] IN BLOOD BY AUTOMATED COUNT: 12.5 % (ref 12.3–15.4)
GFR SERPL CREATININE-BSD FRML MDRD: 79 ML/MIN/1.73
GLUCOSE SERPL-MCNC: 94 MG/DL (ref 65–99)
HBA1C MFR BLD: 4.9 % (ref 4.8–5.6)
HCT VFR BLD AUTO: 46.8 % (ref 37.5–51)
HGB BLD-MCNC: 15.7 G/DL (ref 13–17.7)
MCH RBC QN AUTO: 33.3 PG (ref 26.6–33)
MCHC RBC AUTO-ENTMCNC: 33.5 G/DL (ref 31.5–35.7)
MCV RBC AUTO: 99.2 FL (ref 79–97)
PLATELET # BLD AUTO: 146 10*3/MM3 (ref 140–450)
PMV BLD AUTO: 9.6 FL (ref 6–12)
POTASSIUM SERPL-SCNC: 4 MMOL/L (ref 3.5–5.2)
QT INTERVAL: 398 MS
QTC INTERVAL: 450 MS
RBC # BLD AUTO: 4.72 10*6/MM3 (ref 4.14–5.8)
SODIUM SERPL-SCNC: 139 MMOL/L (ref 136–145)
WBC # BLD AUTO: 3.56 10*3/MM3 (ref 3.4–10.8)

## 2021-04-07 PROCEDURE — 93010 ELECTROCARDIOGRAM REPORT: CPT | Performed by: INTERNAL MEDICINE

## 2021-04-07 PROCEDURE — 93005 ELECTROCARDIOGRAM TRACING: CPT

## 2021-04-07 PROCEDURE — 36415 COLL VENOUS BLD VENIPUNCTURE: CPT

## 2021-04-07 PROCEDURE — 83036 HEMOGLOBIN GLYCOSYLATED A1C: CPT

## 2021-04-07 PROCEDURE — 80048 BASIC METABOLIC PNL TOTAL CA: CPT

## 2021-04-07 PROCEDURE — 71046 X-RAY EXAM CHEST 2 VIEWS: CPT

## 2021-04-07 PROCEDURE — 85027 COMPLETE CBC AUTOMATED: CPT

## 2021-04-07 NOTE — PAT
Patient given a prescription for Benzol Peroxide 5% wash during PAT visit.  Instructed them to fill the prescription or  from [facility] pharmacy if was submitted electronically by the surgeon's office.  Verbal and written instructions given regarding proper use of the Benzoyl Peroxide wash given to patient and/or famlily during PAT visit. Patient/family also instructed to complete checklist and return it to Pre-op on the day of surgery.  Patient and/or family verbalized understanding.      Additionally, reinforced with patient to acquire this prescription from the [facility] retail pharmacy before leaving the hospital after PAT visit due to the potential unavailability at local pharmacies.    Instructed pt to go to chest xray.

## 2021-04-19 ENCOUNTER — APPOINTMENT (OUTPATIENT)
Dept: PREADMISSION TESTING | Facility: HOSPITAL | Age: 51
End: 2021-04-19

## 2021-04-19 LAB — SARS-COV-2 RNA PNL SPEC NAA+PROBE: NOT DETECTED

## 2021-04-19 PROCEDURE — U0004 COV-19 TEST NON-CDC HGH THRU: HCPCS

## 2021-04-19 PROCEDURE — C9803 HOPD COVID-19 SPEC COLLECT: HCPCS

## 2021-04-21 ENCOUNTER — TRANSCRIBE ORDERS (OUTPATIENT)
Dept: LAB | Facility: HOSPITAL | Age: 51
End: 2021-04-21

## 2021-04-21 ENCOUNTER — LAB (OUTPATIENT)
Dept: LAB | Facility: HOSPITAL | Age: 51
End: 2021-04-21

## 2021-04-21 DIAGNOSIS — R79.1 ABNORMAL COAGULATION PROFILE: ICD-10-CM

## 2021-04-21 DIAGNOSIS — R79.1 ABNORMAL COAGULATION PROFILE: Primary | ICD-10-CM

## 2021-04-21 LAB
INR PPP: 1.47 (ref 0.9–1.1)
PROTHROMBIN TIME: 18.3 SECONDS (ref 12–15.1)

## 2021-04-21 PROCEDURE — 36415 COLL VENOUS BLD VENIPUNCTURE: CPT

## 2021-04-21 PROCEDURE — 85610 PROTHROMBIN TIME: CPT

## 2021-05-07 ENCOUNTER — TRANSCRIBE ORDERS (OUTPATIENT)
Dept: PHYSICAL THERAPY | Facility: CLINIC | Age: 51
End: 2021-05-07

## 2021-05-07 DIAGNOSIS — M75.121 COMPLETE TEAR OF RIGHT ROTATOR CUFF, UNSPECIFIED WHETHER TRAUMATIC: Primary | ICD-10-CM

## 2021-05-14 ENCOUNTER — TREATMENT (OUTPATIENT)
Dept: PHYSICAL THERAPY | Facility: CLINIC | Age: 51
End: 2021-05-14

## 2021-05-14 DIAGNOSIS — Z98.890 H/O ARTHROSCOPY OF SHOULDER: Primary | ICD-10-CM

## 2021-05-14 PROCEDURE — 97110 THERAPEUTIC EXERCISES: CPT | Performed by: PHYSICAL THERAPIST

## 2021-05-14 PROCEDURE — 97140 MANUAL THERAPY 1/> REGIONS: CPT | Performed by: PHYSICAL THERAPIST

## 2021-05-14 PROCEDURE — 97161 PT EVAL LOW COMPLEX 20 MIN: CPT | Performed by: PHYSICAL THERAPIST

## 2021-05-14 NOTE — PROGRESS NOTES
Physical Therapy Initial Evaluation and Plan of Care      Patient: Charli Le   : 1970  Diagnosis/ICD-10 Code:  H/O arthroscopy of shoulder [Z98.890]  Referring practitioner: Roe Paige MD    Subjective Evaluation    History of Present Illness  Mechanism of injury: Fall 21 onto the R shoulder and injured His R RTC.  Previous injury in 2018.    Surgery 21.  Wear the sling since.      He returns to MD on .        Pain  Current pain ratin  At worst pain ratin  Location: R shoulder   Quality: throbbing, pressure and discomfort  Relieving factors: relaxation, rest and ice  Aggravating factors: movement and prolonged positioning    Hand dominance: right    Treatments  Previous treatment: physical therapy  Patient Goals  Patient goals for therapy: increased strength, independence with ADLs/IADLs, return to sport/leisure activities, return to work, increased motion, improved balance and decreased pain             Objective          Postural Observations  Seated posture: good  Standing posture: good  Correction of posture: makes symptoms better        Observations     Right Shoulder  Positive for edema.     Additional Shoulder Observation Details  R UE sling present and in use.     Palpation     Additional Palpation Details  Hypertonic periscapular MM.  Palpation and testing seemed to feel good and reduced pain.     Passive Range of Motion     Right Shoulder   Flexion: 60 (standing) degrees with pain  Extension: 15 degrees with pain  Abduction: 25 degrees with pain    Additional Passive Range of Motion Details  R Supine abduction ~65 degrees    R Supine flexion ~105 degrees.     Scapular Mobility     Additional Scapular Mobility Details  Active scapular motion seemed to feel good.           Assessment & Plan     Assessment  Impairments: abnormal muscle firing, abnormal or restricted ROM, activity intolerance, impaired physical strength, lacks appropriate home exercise  program and pain with function  Assessment details: Patient is a 51 year old male who comes to physical therapy with R shoulder injury with fall.  He has surgery for RTC repair and arthroscopic debridement. Signs and symptoms are consistent with diagnosis.  The patient currently has pain, decreased ROM, decreased strength, and inability to perform all essential functional activities. Pt will benefit from skilled PT services to address the above issues.     Prognosis: fair  Prognosis details:   SHORT TERM GOALS:     4 weeks  1. Pt I w/ HEP  2. Pt to demonstrate PROM of the right shoulder to WFL to improve ability to perform ADL's  3. Pt to demonstrate ability to perform 30 minutes continuous activity in the clinic without increase in pain     LONG TERM GOALS:   8 weeks  1. Pt to demonstrate AROM of the right shoulder to WNL to allow ability to perform all necessary functional activities  2. Pt to demonstrate ability to lift 5# OH with the right arm without increase in pain  3. Pt to report being able to work full duty without increase in pain in the shoulder  4. Pt to tolerate 60 minutes continuous activity in the clinic without increase in pain     Functional Limitations: carrying objects, lifting, pulling, pushing, reaching behind back, reaching overhead and unable to perform repetitive tasks  Plan  Therapy options: will be seen for skilled physical therapy services  Planned modality interventions: cryotherapy, electrical stimulation/Russian stimulation, thermotherapy (hydrocollator packs) and ultrasound  Planned therapy interventions: manual therapy, flexibility, functional ROM exercises, home exercise program, joint mobilization, body mechanics training, postural training, soft tissue mobilization, strengthening, stretching and therapeutic activities  Treatment plan discussed with: patient  Plan details: Pt to be seen 2-3 days per week for 6-8 weeks.         Manual Therapy:    11     mins  32224;  Therapeutic  Exercise:    15     mins  47883;     Neuromuscular Nick:        mins  17065;    Therapeutic Activity:          mins  76494;     Gait Training:           mins  20199;     Ultrasound:          mins  46280;    Electrical Stimulation:         mins  78121 ( );  Dry Needling          mins self-pay    Timed Treatment:   26   mins   Total Treatment:     58   mins    PT SIGNATURE: Anup Massey, PT   DATE TREATMENT INITIATED: 5/14/2021    Initial Certification  Certification Period: 8/12/2021  I certify that the therapy services are furnished while this patient is under my care.  The services outlined above are required by this patient, and will be reviewed every 90 days.     PHYSICIAN: Roe Paige MD      DATE:     Please sign and return via fax to  .. Thank you, [facility].

## 2021-05-19 ENCOUNTER — TREATMENT (OUTPATIENT)
Dept: PHYSICAL THERAPY | Facility: CLINIC | Age: 51
End: 2021-05-19

## 2021-05-19 DIAGNOSIS — S29.019A THORACIC MYOFASCIAL STRAIN, INITIAL ENCOUNTER: ICD-10-CM

## 2021-05-19 DIAGNOSIS — Z98.890 H/O ARTHROSCOPY OF SHOULDER: Primary | ICD-10-CM

## 2021-05-19 PROCEDURE — 97530 THERAPEUTIC ACTIVITIES: CPT | Performed by: PHYSICAL THERAPIST

## 2021-05-19 PROCEDURE — 97110 THERAPEUTIC EXERCISES: CPT | Performed by: PHYSICAL THERAPIST

## 2021-05-19 NOTE — PROGRESS NOTES
Physical Therapy Daily Progress Note    Patient Information  Charli Le  1970      Visit # : 2    Charli Le reports 0/10 pain today at rest.  4/10 pain with motion of the shoulder.         Objective Pt presents to PT today with no distress at rest.  Pt is wearing sling entering PT area.      Pt with no good response to activity.      AAROM wand flexion R shoulder flexion supine 127 degrees.     See Exercise, Manual, and Modality Logs for complete treatment.     Assessment/Plan  Pt with good increase in ROM activity today.  He is not having pain at rest.  He may be one to push too much so we need to make sure he is not overdoing it.       Progress per Plan of Care and Progress strengthening /stabilization /functional activity      Visit Diagnoses:    ICD-10-CM ICD-9-CM   1. H/O arthroscopy of shoulder  Z98.890 V45.89   2. Thoracic myofascial strain, initial encounter  S29.019A 847.1            Manual Therapy:         mins  58359;  Therapeutic Exercise:    29     mins  99207;     Neuromuscular Nick:        mins  85688;    Therapeutic Activity:     12     mins  51252;     Gait Training:           mins  85040;     Ultrasound:          mins  50868;    Electrical Stimulation:         mins  08388 ( );  Dry Needling          mins self-pay    Timed Treatment:   41   mins   Total Treatment:     55   mins          Anup Massey, PT  Physical Therapist

## 2021-05-21 ENCOUNTER — TREATMENT (OUTPATIENT)
Dept: PHYSICAL THERAPY | Facility: CLINIC | Age: 51
End: 2021-05-21

## 2021-05-21 DIAGNOSIS — Z98.890 H/O ARTHROSCOPY OF SHOULDER: Primary | ICD-10-CM

## 2021-05-21 PROCEDURE — 97530 THERAPEUTIC ACTIVITIES: CPT | Performed by: PHYSICAL THERAPIST

## 2021-05-21 PROCEDURE — 97140 MANUAL THERAPY 1/> REGIONS: CPT | Performed by: PHYSICAL THERAPIST

## 2021-05-21 PROCEDURE — 97110 THERAPEUTIC EXERCISES: CPT | Performed by: PHYSICAL THERAPIST

## 2021-05-21 NOTE — PROGRESS NOTES
Physical Therapy Daily Progress Note    Patient Information  Charli Le  1970      Visit # : 3    Charli Le reports 3/10 pain today at rest.  Pt reports still sleeping in the chair.  He really wants to move back to the bed.          Objective Pt presents to PT today with sling in use.      Pt with less guarding noted with activity.     AROM:   Supine flexion 129 degrees.     A/AROM:  R shoulder wand flexion 147 degrees.     See Exercise, Manual, and Modality Logs for complete treatment.     Assessment/Plan  Pt with improved ROM and less guarding.  He is still trying to push himself maybe too much.        Progress per Plan of Care and Progress strengthening /stabilization /functional activity      Visit Diagnoses:    ICD-10-CM ICD-9-CM   1. H/O arthroscopy of shoulder  Z98.890 V45.89            Manual Therapy:    10     mins  56379;  Therapeutic Exercise:    26     mins  90140;     Neuromuscular Nick:        mins  60960;    Therapeutic Activity:     12     mins  76540;     Gait Training:           mins  03064;     Ultrasound:          mins  84792;    Electrical Stimulation:         mins  67540 ( );  Dry Needling          mins self-pay    Timed Treatment:   48   mins   Total Treatment:     65   mins          Anup Massey, PT  Physical Therapist

## 2021-05-28 ENCOUNTER — TREATMENT (OUTPATIENT)
Dept: PHYSICAL THERAPY | Facility: CLINIC | Age: 51
End: 2021-05-28

## 2021-05-28 DIAGNOSIS — Z98.890 H/O ARTHROSCOPY OF SHOULDER: Primary | ICD-10-CM

## 2021-05-28 PROCEDURE — 97110 THERAPEUTIC EXERCISES: CPT | Performed by: PHYSICAL THERAPIST

## 2021-05-28 PROCEDURE — 97530 THERAPEUTIC ACTIVITIES: CPT | Performed by: PHYSICAL THERAPIST

## 2021-05-28 NOTE — PROGRESS NOTES
Physical Therapy Daily Progress Note    Patient Information  Charli Le  1970      Visit # : 4    Charli Le reports 2/10 pain today at rest.  Pt reports pain in the lat area of the shoulder on the R UE.          Objective Pt presents to PT today with no distress noted.   Pt is very eager to progress.     Pt is able to perform SL shoulder abd with good strength    AROM: supine flexion 150 degrees.  Standing 135 degrees      See Exercise, Manual, and Modality Logs for complete treatment.     Assessment/Plan  Pt with improved strength and improved ROM. He is having less pain at rest as well.  He is doing really well.       Progress per Plan of Care and Progress strengthening /stabilization /functional activity      Visit Diagnoses:    ICD-10-CM ICD-9-CM   1. H/O arthroscopy of shoulder  Z98.890 V45.89            Manual Therapy:         mins  94724;  Therapeutic Exercise:    42     mins  67611;     Neuromuscular Nick:        mins  77384;    Therapeutic Activity:     13     mins  45429;     Gait Training:           mins  04733;     Ultrasound:          mins  71421;    Electrical Stimulation:         mins  22402 ( );  Dry Needling          mins self-pay    Timed Treatment:   55   mins   Total Treatment:     55   mins          Anup Massey, PT  Physical Therapist

## 2021-06-01 ENCOUNTER — TREATMENT (OUTPATIENT)
Dept: PHYSICAL THERAPY | Facility: CLINIC | Age: 51
End: 2021-06-01

## 2021-06-01 DIAGNOSIS — M25.511 CHRONIC RIGHT SHOULDER PAIN: Primary | ICD-10-CM

## 2021-06-01 DIAGNOSIS — G89.29 CHRONIC RIGHT SHOULDER PAIN: Primary | ICD-10-CM

## 2021-06-01 PROCEDURE — 97140 MANUAL THERAPY 1/> REGIONS: CPT | Performed by: PHYSICAL THERAPIST

## 2021-06-01 PROCEDURE — 97530 THERAPEUTIC ACTIVITIES: CPT | Performed by: PHYSICAL THERAPIST

## 2021-06-01 PROCEDURE — 97110 THERAPEUTIC EXERCISES: CPT | Performed by: PHYSICAL THERAPIST

## 2021-06-01 NOTE — PROGRESS NOTES
Physical Therapy Daily Progress Note    Patient: Charli Le   : 1970  Diagnosis/ICD-10 Code:  Chronic right shoulder pain [M25.511, G89.29]  Referring practitioner: Roe Paige MD  Date of Initial Visit: Type: THERAPY  Noted: 2021  Today's Date: 2021  Patient seen for 5 sessions         Charli Le reports that he took 2 days off from physical therapy exercises since his last visit (21).  Feels more motivated to exercise when he has an appointment.  Still difficulty sleeping and is using a recliner.  Taking Tylenol for pain control, which is somewhat effective.      Subjective     Objective   See Exercise, Manual, and Modality Logs for complete treatment.       Assessment/Plan  Mr. Le is 5w 6d s/p R RTCR on 21.  Focused visit on heavy education regarding protection of the repair site by not performing active motion.  Combined with coming out of the sling every hour while working to reduce any soreness and stiffness.  Further education was provided about surgical procedure and importance of bone and tendon healing in the first 12 weeks s/p. Otherwise, mobility looks great in gravity reduced positions into forward elevation.         Manual Therapy:    9     mins  22728;  Therapeutic Exercise:    12     mins  34785;     Neuromuscular Nick:        mins  88498;    Therapeutic Activity:     10     mins  54110;     Gait Training:           mins  69992;     Ultrasound:          mins  54766;    Electrical Stimulation:         mins  53616 ( );  Dry Needling          mins self-pay    Timed Treatment:   31   mins   Total Treatment:     31   mins    Madi Cardenas PT  Physical Therapist

## 2021-06-11 ENCOUNTER — TREATMENT (OUTPATIENT)
Dept: PHYSICAL THERAPY | Facility: CLINIC | Age: 51
End: 2021-06-11

## 2021-06-11 DIAGNOSIS — M25.511 CHRONIC RIGHT SHOULDER PAIN: Primary | ICD-10-CM

## 2021-06-11 DIAGNOSIS — Z98.890 H/O ARTHROSCOPY OF SHOULDER: ICD-10-CM

## 2021-06-11 DIAGNOSIS — G89.29 CHRONIC RIGHT SHOULDER PAIN: Primary | ICD-10-CM

## 2021-06-11 PROCEDURE — 97110 THERAPEUTIC EXERCISES: CPT | Performed by: PHYSICAL THERAPIST

## 2021-06-11 PROCEDURE — 97140 MANUAL THERAPY 1/> REGIONS: CPT | Performed by: PHYSICAL THERAPIST

## 2021-06-11 PROCEDURE — 97530 THERAPEUTIC ACTIVITIES: CPT | Performed by: PHYSICAL THERAPIST

## 2021-06-11 NOTE — PROGRESS NOTES
Physical Therapy Daily Progress Note    Patient Information  Charli Le  1970      Visit # : 6    Charli Le reports 1-2/10 pain today at rest.  Pt reports he is back in the bed now.    Pt with more soreness in the R back and shoulder blade area.         Objective Pt presents to PT today with no sling in use.     AROM:  Supine flexion 148 degrees. .    PROM:  R shoulder ER 74 degrees,  IR 26 degrees.       See Exercise, Manual, and Modality Logs for complete treatment.     Assessment/Plan  Pt with improved tolerance to motion and activity.  The ROM is slightly tighter at end ranges of the R shoulder in flexion.        Progress per Plan of Care and Progress strengthening /stabilization /functional activity      Visit Diagnoses:    ICD-10-CM ICD-9-CM   1. Chronic right shoulder pain  M25.511 719.41    G89.29 338.29   2. H/O arthroscopy of shoulder  Z98.890 V45.89            Manual Therapy:    12     mins  60894;  Therapeutic Exercise:    28     mins  14277;     Neuromuscular Nick:        mins  53243;    Therapeutic Activity:     11     mins  07810;     Gait Training:           mins  45994;     Ultrasound:          mins  22463;    Electrical Stimulation:         mins  43455 ( );  Dry Needling          mins self-pay    Timed Treatment:   51   mins   Total Treatment:     61   mins          Anup Massey, PT  Physical Therapist

## 2021-06-14 ENCOUNTER — TREATMENT (OUTPATIENT)
Dept: PHYSICAL THERAPY | Facility: CLINIC | Age: 51
End: 2021-06-14

## 2021-06-14 DIAGNOSIS — Z98.890 H/O ARTHROSCOPY OF SHOULDER: ICD-10-CM

## 2021-06-14 DIAGNOSIS — M25.511 CHRONIC RIGHT SHOULDER PAIN: Primary | ICD-10-CM

## 2021-06-14 DIAGNOSIS — G89.29 CHRONIC RIGHT SHOULDER PAIN: Primary | ICD-10-CM

## 2021-06-14 PROCEDURE — 97530 THERAPEUTIC ACTIVITIES: CPT | Performed by: PHYSICAL THERAPIST

## 2021-06-14 PROCEDURE — 97110 THERAPEUTIC EXERCISES: CPT | Performed by: PHYSICAL THERAPIST

## 2021-06-14 NOTE — PROGRESS NOTES
Physical Therapy Daily Progress Note    Patient Information  Charli Le  1970      Visit # : 7    Charli Le reports 3/10 pain today at rest.  Pt reports he soreness is more in the Lat area of the R LE.        Objective Pt presents to PT today with no distress noted.      AROM:  155 degrees supine R shoulder flexion    Pt with no pain resting post PT.     See Exercise, Manual, and Modality Logs for complete treatment.     Assessment/Plan  Pt with improved ROM and improved strength.  He is doing well.       Progress per Plan of Care and Progress strengthening /stabilization /functional activity      Visit Diagnoses:    ICD-10-CM ICD-9-CM   1. Chronic right shoulder pain  M25.511 719.41    G89.29 338.29   2. H/O arthroscopy of shoulder  Z98.890 V45.89            Manual Therapy:         mins  50950;  Therapeutic Exercise:    39     mins  78795;     Neuromuscular Nick:        mins  09824;    Therapeutic Activity:     11     mins  65586;     Gait Training:           mins  00732;     Ultrasound:          mins  37221;    Electrical Stimulation:         mins  89280 ( );  Dry Needling          mins self-pay    Timed Treatment:   50   mins   Total Treatment:     62   mins          Anup Massey, PT  Physical Therapist

## 2021-06-18 ENCOUNTER — TREATMENT (OUTPATIENT)
Dept: PHYSICAL THERAPY | Facility: CLINIC | Age: 51
End: 2021-06-18

## 2021-06-18 DIAGNOSIS — M25.511 CHRONIC RIGHT SHOULDER PAIN: Primary | ICD-10-CM

## 2021-06-18 DIAGNOSIS — Z98.890 H/O ARTHROSCOPY OF SHOULDER: ICD-10-CM

## 2021-06-18 DIAGNOSIS — G89.29 CHRONIC RIGHT SHOULDER PAIN: Primary | ICD-10-CM

## 2021-06-18 PROCEDURE — 97530 THERAPEUTIC ACTIVITIES: CPT | Performed by: PHYSICAL THERAPIST

## 2021-06-18 PROCEDURE — 97110 THERAPEUTIC EXERCISES: CPT | Performed by: PHYSICAL THERAPIST

## 2021-06-18 NOTE — PROGRESS NOTES
Physical Therapy Daily Progress Note    Patient Information  Charli Le  1970      Visit # : 8    Charli Le reports 0/10 pain today at rest.  Pt reports he had a shift and a tweak 2 days ago.  He was fearful that he did something wrong with it.         Objective Pt presents to PT today with no distress noted at rest.     AROM: Supine R shoulder flexion 155 degrees     Pt with no catching noted with ROM and exercise activity.      See Exercise, Manual, and Modality Logs for complete treatment.     Assessment/Plan  Pt with no pain at rest.  He is still having ERP and pressure at the end ranges. He is pushing himself a little more today.        Progress per Plan of Care and Progress strengthening /stabilization /functional activity  Check response to elevated activity today.     Visit Diagnoses:    ICD-10-CM ICD-9-CM   1. Chronic right shoulder pain  M25.511 719.41    G89.29 338.29   2. H/O arthroscopy of shoulder  Z98.890 V45.89            Manual Therapy:         mins  32928;  Therapeutic Exercise:    39     mins  06144;     Neuromuscular Nick:        mins  46800;    Therapeutic Activity:     11     mins  05044;     Gait Training:           mins  15234;     Ultrasound:          mins  81679;    Electrical Stimulation:         mins  40789 ( );  Dry Needling          mins self-pay    Timed Treatment:   50   mins   Total Treatment:     62   mins          Anup Massey, PT  Physical Therapist

## 2021-06-21 ENCOUNTER — TREATMENT (OUTPATIENT)
Dept: PHYSICAL THERAPY | Facility: CLINIC | Age: 51
End: 2021-06-21

## 2021-06-21 DIAGNOSIS — Z98.890 H/O ARTHROSCOPY OF SHOULDER: ICD-10-CM

## 2021-06-21 DIAGNOSIS — G89.29 CHRONIC RIGHT SHOULDER PAIN: Primary | ICD-10-CM

## 2021-06-21 DIAGNOSIS — M25.511 CHRONIC RIGHT SHOULDER PAIN: Primary | ICD-10-CM

## 2021-06-21 PROCEDURE — 97110 THERAPEUTIC EXERCISES: CPT | Performed by: PHYSICAL THERAPIST

## 2021-06-21 PROCEDURE — 97530 THERAPEUTIC ACTIVITIES: CPT | Performed by: PHYSICAL THERAPIST

## 2021-06-21 NOTE — PROGRESS NOTES
Physical Therapy Daily Progress Note    Patient Information  Charli Le  1970      Visit # : 9    Charli Le reports 0/10 pain today at rest.  Sleeping is still painful and limited.       Objective Pt presents to PT today with no distress noted.     R shoulder A/AROM R shoulder flexion 157 degrees.     See Exercise, Manual, and Modality Logs for complete treatment.     Assessment/Plan  Pt with improved ROM and strength.  He is able to perform higher levels of activity without elevated pain.       Progress per Plan of Care and Progress strengthening /stabilization /functional activity      Visit Diagnoses:    ICD-10-CM ICD-9-CM   1. Chronic right shoulder pain  M25.511 719.41    G89.29 338.29   2. H/O arthroscopy of shoulder  Z98.890 V45.89            Manual Therapy:         mins  08256;  Therapeutic Exercise:    41     mins  32763;     Neuromuscular Nick:        mins  33187;    Therapeutic Activity:     12     mins  63297;     Gait Training:           mins  04498;     Ultrasound:          mins  64138;    Electrical Stimulation:         mins  55395 ( );  Dry Needling          mins self-pay    Timed Treatment:   53   mins   Total Treatment:     65   mins          Anup Massey, PT  Physical Therapist

## 2021-06-25 ENCOUNTER — TREATMENT (OUTPATIENT)
Dept: PHYSICAL THERAPY | Facility: CLINIC | Age: 51
End: 2021-06-25

## 2021-06-25 DIAGNOSIS — M25.511 CHRONIC RIGHT SHOULDER PAIN: Primary | ICD-10-CM

## 2021-06-25 DIAGNOSIS — Z98.890 H/O ARTHROSCOPY OF SHOULDER: ICD-10-CM

## 2021-06-25 DIAGNOSIS — S29.019A THORACIC MYOFASCIAL STRAIN, INITIAL ENCOUNTER: ICD-10-CM

## 2021-06-25 DIAGNOSIS — G89.29 CHRONIC RIGHT SHOULDER PAIN: Primary | ICD-10-CM

## 2021-06-25 PROCEDURE — 97530 THERAPEUTIC ACTIVITIES: CPT | Performed by: PHYSICAL THERAPIST

## 2021-06-25 PROCEDURE — 97110 THERAPEUTIC EXERCISES: CPT | Performed by: PHYSICAL THERAPIST

## 2021-06-25 NOTE — PROGRESS NOTES
Physical Therapy Daily Progress Note    Patient Information  Charli Le  1970      Visit # : 10    Charli Le reports 0/10 pain today at rest.  Pt with less pain and improved function.         Objective Pt presents to PT today with no distress noted.     AAROM:  R shoulder flexion 158 degrees.     Pt with more MM activity noted in the periscapular MM of the R UE.  He is getting stronger and having better motion.     See Exercise, Manual, and Modality Logs for complete treatment.     Assessment/Plan  Pt with improved overall status.  He is getting stronger and he is not doing too much functional activity on his own.       Progress per Plan of Care and Progress strengthening /stabilization /functional activity      Visit Diagnoses:    ICD-10-CM ICD-9-CM   1. Chronic right shoulder pain  M25.511 719.41    G89.29 338.29   2. H/O arthroscopy of shoulder  Z98.890 V45.89   3. Thoracic myofascial strain, initial encounter  S29.019A 847.1            Manual Therapy:         mins  93481;  Therapeutic Exercise:    42     mins  56960;     Neuromuscular Nick:        mins  39293;    Therapeutic Activity:     13     mins  11179;     Gait Training:           mins  30464;     Ultrasound:          mins  34723;    Electrical Stimulation:         mins  82409 ( );  Dry Needling          mins self-pay    Timed Treatment:   55   mins   Total Treatment:     55   mins          Anup Massey, PT  Physical Therapist

## 2021-06-28 ENCOUNTER — TREATMENT (OUTPATIENT)
Dept: PHYSICAL THERAPY | Facility: CLINIC | Age: 51
End: 2021-06-28

## 2021-06-28 DIAGNOSIS — Z98.890 H/O ARTHROSCOPY OF SHOULDER: ICD-10-CM

## 2021-06-28 DIAGNOSIS — M25.511 CHRONIC RIGHT SHOULDER PAIN: Primary | ICD-10-CM

## 2021-06-28 DIAGNOSIS — G89.29 CHRONIC RIGHT SHOULDER PAIN: Primary | ICD-10-CM

## 2021-06-28 PROCEDURE — 97530 THERAPEUTIC ACTIVITIES: CPT | Performed by: PHYSICAL THERAPIST

## 2021-06-28 PROCEDURE — 97110 THERAPEUTIC EXERCISES: CPT | Performed by: PHYSICAL THERAPIST

## 2021-06-28 NOTE — PROGRESS NOTES
Physical Therapy Daily Progress Note    Patient Information  Charli Le  1970      Visit # : 11    Charli Le reports 1-2/10 pain today at rest.  Pt reports soreness today and he was fearful of what that means.       Objective Pt presents to PT today with no distress at rest.     Pt is not using the sling.  He is able to perform AROM AG up to 100 degrees abd,  130 degrees flexion     AROM:  R shoulder flexion 160 degrees,      PROM:  R ER 80 degrees.       See Exercise, Manual, and Modality Logs for complete treatment.     Assessment/Plan  Pt is making great progress.  He is improving his mobility and strength.  He is progressing well.  He still needs more mobility and improved strength and less pain.  Continue with initial goals.       Progress per Plan of Care and Progress strengthening /stabilization /functional activity  2x/week for 6-8 weeks.     Visit Diagnoses:    ICD-10-CM ICD-9-CM   1. Chronic right shoulder pain  M25.511 719.41    G89.29 338.29   2. H/O arthroscopy of shoulder  Z98.890 V45.89            Manual Therapy:         mins  66964;  Therapeutic Exercise:    39     mins  01660;     Neuromuscular Nick:        mins  35807;    Therapeutic Activity:     11     mins  86040;     Gait Training:           mins  74642;     Ultrasound:          mins  23018;    Electrical Stimulation:         mins  52748 ( );  Dry Needling          mins self-pay    Timed Treatment:   50   mins   Total Treatment:     63   mins          Anup Massey, PT  Physical Therapist

## 2021-07-02 ENCOUNTER — TREATMENT (OUTPATIENT)
Dept: PHYSICAL THERAPY | Facility: CLINIC | Age: 51
End: 2021-07-02

## 2021-07-02 DIAGNOSIS — G89.29 CHRONIC RIGHT SHOULDER PAIN: Primary | ICD-10-CM

## 2021-07-02 DIAGNOSIS — M25.511 CHRONIC RIGHT SHOULDER PAIN: Primary | ICD-10-CM

## 2021-07-02 DIAGNOSIS — Z98.890 H/O ARTHROSCOPY OF SHOULDER: ICD-10-CM

## 2021-07-02 PROCEDURE — 97110 THERAPEUTIC EXERCISES: CPT | Performed by: PHYSICAL THERAPIST

## 2021-07-02 PROCEDURE — 97530 THERAPEUTIC ACTIVITIES: CPT | Performed by: PHYSICAL THERAPIST

## 2021-07-02 NOTE — PROGRESS NOTES
Physical Therapy Daily Progress Note    Patient Information  Charli Le  1970      Visit # : 12    Charli Le reports 0/10 pain today at rest.  Patient reports that he had some muscle soreness following last session but no unusual popping or pain in the R shoulder.         Objective Pt presents to PT today with no distress noted.     R shoulder AROM   Flexion: 162      See Exercise, Manual, and Modality Logs for complete treatment.     Assessment/Plan  Patient tolerated session well with no reports of pain with exercises. He continues to demonstrate improvements in R shoulder AROM. Patient had reports of stiffness at end range. Patient continues to lack full ROM and has pain at times. He returns to surgeon July 6th.       Progress per Plan of Care  PT will continue to monitor patients signs and symptoms and progress patient as tolerated.     Visit Diagnoses:    ICD-10-CM ICD-9-CM   1. Chronic right shoulder pain  M25.511 719.41    G89.29 338.29   2. H/O arthroscopy of shoulder  Z98.890 V45.89            Manual Therapy:         mins  84423;  Therapeutic Exercise:    38     mins  08167;     Neuromuscular Nick:        mins  59203;    Therapeutic Activity:     12     mins  62393;     Gait Training:           mins  33100;     Ultrasound:          mins  87122;    Electrical Stimulation:         mins  23961 ( );  Dry Needling          mins self-pay    Timed Treatment:   50   mins   Total Treatment:     62   mins          Chantal Coronado PT  Physical Therapist

## 2021-07-07 ENCOUNTER — TREATMENT (OUTPATIENT)
Dept: PHYSICAL THERAPY | Facility: CLINIC | Age: 51
End: 2021-07-07

## 2021-07-07 DIAGNOSIS — M25.511 CHRONIC RIGHT SHOULDER PAIN: Primary | ICD-10-CM

## 2021-07-07 DIAGNOSIS — G89.29 CHRONIC RIGHT SHOULDER PAIN: Primary | ICD-10-CM

## 2021-07-07 DIAGNOSIS — Z98.890 H/O ARTHROSCOPY OF SHOULDER: ICD-10-CM

## 2021-07-07 PROCEDURE — 97530 THERAPEUTIC ACTIVITIES: CPT | Performed by: PHYSICAL THERAPIST

## 2021-07-07 PROCEDURE — 97110 THERAPEUTIC EXERCISES: CPT | Performed by: PHYSICAL THERAPIST

## 2021-07-07 NOTE — PROGRESS NOTES
Physical Therapy Daily Progress Note    Patient Information  Charli Le  1970      Visit # : 13    Charli Le reports 0/10 pain today at rest.  Pt reports he went to the MD and the MD is happy with his progress.  He is pushing himself a little more now.          Objective Pt presents to PT today with no distress noted.  Pt is able to lift arm AG.       R shoulder flexion supine 160 degrees.     Standing flexion and abd both above 90 degrees but limited by tightness and weakness.     See Exercise, Manual, and Modality Logs for complete treatment.     Assessment/Plan  Pt with improved overall strength.  He is still having weakness and limited ROM in the R UE.       Progress per Plan of Care and Progress strengthening /stabilization /functional activity      Visit Diagnoses:    ICD-10-CM ICD-9-CM   1. Chronic right shoulder pain  M25.511 719.41    G89.29 338.29   2. H/O arthroscopy of shoulder  Z98.890 V45.89            Manual Therapy:         mins  88435;  Therapeutic Exercise:    32     mins  70818;     Neuromuscular Nick:        mins  03410;    Therapeutic Activity:     11     mins  62876;     Gait Training:           mins  47507;     Ultrasound:          mins  90239;    Electrical Stimulation:         mins  05491 ( );  Dry Needling          mins self-pay    Timed Treatment:   43   mins   Total Treatment:     53   mins          Anup Massey, PT  Physical Therapist

## 2021-07-12 ENCOUNTER — TREATMENT (OUTPATIENT)
Dept: PHYSICAL THERAPY | Facility: CLINIC | Age: 51
End: 2021-07-12

## 2021-07-12 DIAGNOSIS — G89.29 CHRONIC RIGHT SHOULDER PAIN: Primary | ICD-10-CM

## 2021-07-12 DIAGNOSIS — Z98.890 H/O ARTHROSCOPY OF SHOULDER: ICD-10-CM

## 2021-07-12 DIAGNOSIS — M25.511 CHRONIC RIGHT SHOULDER PAIN: Primary | ICD-10-CM

## 2021-07-12 PROCEDURE — 97530 THERAPEUTIC ACTIVITIES: CPT | Performed by: PHYSICAL THERAPIST

## 2021-07-12 PROCEDURE — 97110 THERAPEUTIC EXERCISES: CPT | Performed by: PHYSICAL THERAPIST

## 2021-07-12 NOTE — PROGRESS NOTES
Physical Therapy Daily Progress Note    Patient Information  Charli Le  1970      Visit # : 14    Charli Le reports 0/10 pain today at rest.  Pt reports he is doing well.  He does have an occasional pop with some soreness but nothing consistent.         Objective Pt presents to PT today with no distress noted.     A/AROM: R shoulder flexion 164 degrees supine.     Pt encouraged to continue to slowly progress strengthening activity.  He is understanding the importance in controlling mechanics.       See Exercise, Manual, and Modality Logs for complete treatment.     Assessment/Plan  Pt with improved ROM and strength.  He is not having any pain at rest.  He is progressing well.       Progress per Plan of Care and Progress strengthening /stabilization /functional activity      Visit Diagnoses:    ICD-10-CM ICD-9-CM   1. Chronic right shoulder pain  M25.511 719.41    G89.29 338.29   2. H/O arthroscopy of shoulder  Z98.890 V45.89            Manual Therapy:         mins  19209;  Therapeutic Exercise:    38     mins  91887;     Neuromuscular Nick:        mins  91623;    Therapeutic Activity:     11     mins  34207;     Gait Training:           mins  42145;     Ultrasound:          mins  63302;    Electrical Stimulation:         mins  56846 ( );  Dry Needling          mins self-pay    Timed Treatment:   49   mins   Total Treatment:     59   mins          Anup Massey, PT  Physical Therapist

## 2021-07-16 ENCOUNTER — TREATMENT (OUTPATIENT)
Dept: PHYSICAL THERAPY | Facility: CLINIC | Age: 51
End: 2021-07-16

## 2021-07-16 DIAGNOSIS — G89.29 CHRONIC RIGHT SHOULDER PAIN: Primary | ICD-10-CM

## 2021-07-16 DIAGNOSIS — Z98.890 H/O ARTHROSCOPY OF SHOULDER: ICD-10-CM

## 2021-07-16 DIAGNOSIS — M25.511 CHRONIC RIGHT SHOULDER PAIN: Primary | ICD-10-CM

## 2021-07-16 PROCEDURE — 97530 THERAPEUTIC ACTIVITIES: CPT | Performed by: PHYSICAL THERAPIST

## 2021-07-16 PROCEDURE — 97110 THERAPEUTIC EXERCISES: CPT | Performed by: PHYSICAL THERAPIST

## 2021-07-16 NOTE — PROGRESS NOTES
Physical Therapy Daily Progress Note    Patient Information  Charli Le  1970      Visit # : 15    Charli Le reports 0/10 pain today at rest.  Pt reports he feels like he is ahead of the curve in recovery.         Objective Pt presents to PT today with no distress noted.     Palpation:  Soreness noted in the latissimus.     AROM:  Supine A/AROM 166 degrees.     PROM:  R shoulder ER 80 degrees.     See Exercise, Manual, and Modality Logs for complete treatment.     Assessment/Plan  Pt with improved overall status.  He is having some soreness and MM tightness in the mid thoracic area.       Progress per Plan of Care and Progress strengthening /stabilization /functional activity      Visit Diagnoses:    ICD-10-CM ICD-9-CM   1. Chronic right shoulder pain  M25.511 719.41    G89.29 338.29   2. H/O arthroscopy of shoulder  Z98.890 V45.89            Manual Therapy:         mins  24247;  Therapeutic Exercise:    42     mins  41462;     Neuromuscular Nick:        mins  50666;    Therapeutic Activity:     10     mins  53601;     Gait Training:           mins  18020;     Ultrasound:          mins  82627;    Electrical Stimulation:         mins  83877 ( );  Dry Needling          mins self-pay    Timed Treatment:   52   mins   Total Treatment:     62   mins          Anup Massey, PT  Physical Therapist

## 2021-07-19 ENCOUNTER — TREATMENT (OUTPATIENT)
Dept: PHYSICAL THERAPY | Facility: CLINIC | Age: 51
End: 2021-07-19

## 2021-07-19 DIAGNOSIS — M25.511 CHRONIC RIGHT SHOULDER PAIN: Primary | ICD-10-CM

## 2021-07-19 DIAGNOSIS — G89.29 CHRONIC RIGHT SHOULDER PAIN: Primary | ICD-10-CM

## 2021-07-19 DIAGNOSIS — Z98.890 H/O ARTHROSCOPY OF SHOULDER: ICD-10-CM

## 2021-07-19 PROCEDURE — 97140 MANUAL THERAPY 1/> REGIONS: CPT | Performed by: PHYSICAL THERAPIST

## 2021-07-19 PROCEDURE — 97530 THERAPEUTIC ACTIVITIES: CPT | Performed by: PHYSICAL THERAPIST

## 2021-07-19 PROCEDURE — 97110 THERAPEUTIC EXERCISES: CPT | Performed by: PHYSICAL THERAPIST

## 2021-07-19 NOTE — PROGRESS NOTES
Physical Therapy Daily Progress Note    Patient Information  Charli Le  1970      Visit # : 16    Charli Le reports 0/10 pain today at rest.  Pt reports some spasm in the mid trap and low trap area.          Objective Pt presents to PT today with no distress noted at rest.     Pt with tenderness and MM guarding in the R scapular area/ Rhomboid area in the R periscapular area.      Pt with slight spasm in the R periscapular region that may be coming from the C/S.      See Exercise, Manual, and Modality Logs for complete treatment.     Assessment/Plan  Pt with improved R shoulder function.  Pt with R mid T/S and periscapular MM strain that seems to be coming from C/s       Progress per Plan of Care and Progress strengthening /stabilization /functional activity      Visit Diagnoses:    ICD-10-CM ICD-9-CM   1. Chronic right shoulder pain  M25.511 719.41    G89.29 338.29   2. H/O arthroscopy of shoulder  Z98.890 V45.89            Manual Therapy:    8     mins  15230;  Therapeutic Exercise:    29    mins  19230;     Neuromuscular Nick:        mins  03197;    Therapeutic Activity:    14     mins  81690;     Gait Training:           mins  13436;     Ultrasound:          mins  88721;    Electrical Stimulation:         mins  85082 ( );  Dry Needling          mins self-pay    Timed Treatment:   51   mins   Total Treatment:     51   mins          Anup Massey, PT  Physical Therapist

## 2021-07-21 ENCOUNTER — TREATMENT (OUTPATIENT)
Dept: PHYSICAL THERAPY | Facility: CLINIC | Age: 51
End: 2021-07-21

## 2021-07-21 DIAGNOSIS — G89.29 CHRONIC RIGHT SHOULDER PAIN: Primary | ICD-10-CM

## 2021-07-21 DIAGNOSIS — Z98.890 H/O ARTHROSCOPY OF SHOULDER: ICD-10-CM

## 2021-07-21 DIAGNOSIS — S29.019A THORACIC MYOFASCIAL STRAIN, INITIAL ENCOUNTER: ICD-10-CM

## 2021-07-21 DIAGNOSIS — M25.511 CHRONIC RIGHT SHOULDER PAIN: Primary | ICD-10-CM

## 2021-07-21 PROCEDURE — 97530 THERAPEUTIC ACTIVITIES: CPT | Performed by: PHYSICAL THERAPIST

## 2021-07-21 PROCEDURE — 97110 THERAPEUTIC EXERCISES: CPT | Performed by: PHYSICAL THERAPIST

## 2021-07-21 PROCEDURE — 97140 MANUAL THERAPY 1/> REGIONS: CPT | Performed by: PHYSICAL THERAPIST

## 2021-07-21 NOTE — PROGRESS NOTES
Physical Therapy Daily Progress Note    Patient Information  Charli Le  1970      Visit # : 17    Charli Le reports 2/10 pain today at rest in the T/S and the rib area under the scapula.  Pt reports he is still having sxs under the scapula when he takes a deep breath.      The shoulder seems to be feeling fine its just the breathing and T/S.      Objective Pt presents to PT today with no distress noted.     Pt with T/s and C/s hypomobility and guarding noted more today.  He is very hypertonic in the T/S paraspinal.    T/S mobs and C/S stretching seems to reduce or increase the rib pain he is experiencing.     See Exercise, Manual, and Modality Logs for complete treatment.     Assessment/Plan  Pt with R Shoulder improved but the spine and the periscapular MM seem to be flared up.        Progress per Plan of Care and Progress strengthening /stabilization /functional activity      Visit Diagnoses:    ICD-10-CM ICD-9-CM   1. Chronic right shoulder pain  M25.511 719.41    G89.29 338.29   2. H/O arthroscopy of shoulder  Z98.890 V45.89   3. Thoracic myofascial strain, initial encounter  S29.019A 847.1            Manual Therapy:    16     mins  21002;  Therapeutic Exercise:    24     mins  93217;     Neuromuscular Nick:        mins  21986;    Therapeutic Activity:     12     mins  31254;     Gait Training:           mins  01634;     Ultrasound:          mins  47476;    Electrical Stimulation:         mins  92696 ( );  Dry Needling          mins self-pay    Timed Treatment:   54   mins   Total Treatment:     54   mins          Anup Massey, PT  Physical Therapist

## 2021-07-28 ENCOUNTER — TREATMENT (OUTPATIENT)
Dept: PHYSICAL THERAPY | Facility: CLINIC | Age: 51
End: 2021-07-28

## 2021-07-28 DIAGNOSIS — G89.29 CHRONIC RIGHT SHOULDER PAIN: Primary | ICD-10-CM

## 2021-07-28 DIAGNOSIS — Z98.890 H/O ARTHROSCOPY OF SHOULDER: ICD-10-CM

## 2021-07-28 DIAGNOSIS — M25.511 CHRONIC RIGHT SHOULDER PAIN: Primary | ICD-10-CM

## 2021-07-28 PROCEDURE — 97530 THERAPEUTIC ACTIVITIES: CPT | Performed by: PHYSICAL THERAPIST

## 2021-07-28 PROCEDURE — 97110 THERAPEUTIC EXERCISES: CPT | Performed by: PHYSICAL THERAPIST

## 2021-07-28 NOTE — PROGRESS NOTES
Physical Therapy Daily Progress Note    Patient Information  Charli Le  1970      Visit # : 18    Charli Le reports 1/10 pain today at rest.  2/10 pain in the shoulder blade today.          Objective Pt presents to PT today with no distress noted.     A/AROM: R shoulder flexion 165 degrees.     Palpation:  Low Thoracic spine R minimal hypertonicity.  T/S hypomobility less noted today.     See Exercise, Manual, and Modality Logs for complete treatment.     Assessment/Plan  Pt with improved shoulder motion and strength.  The T/S hypertonicity and pain seem to be less.  The T/s issue may be from doing exercises too hard last 2 weeks.        Progress per Plan of Care and Progress strengthening /stabilization /functional activity      Visit Diagnoses:    ICD-10-CM ICD-9-CM   1. Chronic right shoulder pain  M25.511 719.41    G89.29 338.29   2. H/O arthroscopy of shoulder  Z98.890 V45.89            Manual Therapy:    4    mins  09669;  Therapeutic Exercise:    39     mins  02087;     Neuromuscular Nick:        mins  57953;    Therapeutic Activity:     9     mins  33223;     Gait Training:           mins  27796;     Ultrasound:          mins  92019;    Electrical Stimulation:         mins  99995 ( );  Dry Needling          mins self-pay    Timed Treatment:   52   mins   Total Treatment:     52   mins          Anup Massey, PT  Physical Therapist

## 2021-07-30 ENCOUNTER — TREATMENT (OUTPATIENT)
Dept: PHYSICAL THERAPY | Facility: CLINIC | Age: 51
End: 2021-07-30

## 2021-07-30 DIAGNOSIS — Z98.890 H/O ARTHROSCOPY OF SHOULDER: ICD-10-CM

## 2021-07-30 DIAGNOSIS — M25.511 CHRONIC RIGHT SHOULDER PAIN: Primary | ICD-10-CM

## 2021-07-30 DIAGNOSIS — G89.29 CHRONIC RIGHT SHOULDER PAIN: Primary | ICD-10-CM

## 2021-07-30 PROCEDURE — 97140 MANUAL THERAPY 1/> REGIONS: CPT | Performed by: PHYSICAL THERAPIST

## 2021-07-30 PROCEDURE — 97110 THERAPEUTIC EXERCISES: CPT | Performed by: PHYSICAL THERAPIST

## 2021-07-30 NOTE — PROGRESS NOTES
Physical Therapy Daily Progress Note    Patient Information  Charli Le  1970      Visit # : 19    Charli Le reports 0/10 pain today at rest.  Pt reports the mid back and R shoulder blade are still slightly tender and guarded and painful.          Objective Pt presents to PT today with no distress noted at rest.      A/AROM wand flexion with weight R shoulder flexion 167 degrees.     Pt with less fatigue noted.     The mid T/S is still hypertonic and guarded mildly.  It is not as bad as it was last week.      See Exercise, Manual, and Modality Logs for complete treatment.     Assessment/Plan  Pt with improved overall T/S condition and less pain noted in the scapular region.  The shoulder is doing great but he needs to make sure he is not overdoing it.        Progress per Plan of Care and Progress strengthening /stabilization /functional activity      Visit Diagnoses:    ICD-10-CM ICD-9-CM   1. Chronic right shoulder pain  M25.511 719.41    G89.29 338.29   2. H/O arthroscopy of shoulder  Z98.890 V45.89            Manual Therapy:    11     mins  61165;  Therapeutic Exercise:    39     mins  98465;     Neuromuscular Nick:        mins  57320;    Therapeutic Activity:          mins  16546;     Gait Training:           mins  12505;     Ultrasound:          mins  90671;    Electrical Stimulation:         mins  39095 ( );  Dry Needling          mins self-pay    Timed Treatment:   50   mins   Total Treatment:     50   mins          Anup Massey, PT  Physical Therapist

## 2021-08-04 ENCOUNTER — TREATMENT (OUTPATIENT)
Dept: PHYSICAL THERAPY | Facility: CLINIC | Age: 51
End: 2021-08-04

## 2021-08-04 DIAGNOSIS — G89.29 CHRONIC RIGHT SHOULDER PAIN: Primary | ICD-10-CM

## 2021-08-04 DIAGNOSIS — Z98.890 H/O ARTHROSCOPY OF SHOULDER: ICD-10-CM

## 2021-08-04 DIAGNOSIS — M25.511 CHRONIC RIGHT SHOULDER PAIN: Primary | ICD-10-CM

## 2021-08-04 PROCEDURE — 97110 THERAPEUTIC EXERCISES: CPT | Performed by: PHYSICAL THERAPIST

## 2021-08-04 PROCEDURE — 97140 MANUAL THERAPY 1/> REGIONS: CPT | Performed by: PHYSICAL THERAPIST

## 2021-08-04 NOTE — PROGRESS NOTES
Physical Therapy Daily Progress Note    Patient Information  Charli Le  1970      Visit # : 20    Charli Le reports 1/10 pain today at rest.  Patient reports that he has some soreness today. He states that he had some soreness following last session. He states that he was also in the pool most of the weekend. Patient is concerned he may have damaged his repair.         Objective Pt presents to PT today with no distress noted.     AAROM wand flexion: 169 deg R shoulder.    Fatigue noted in R UE following session with mild muscle tremor noted.       See Exercise, Manual, and Modality Logs for complete treatment.     Assessment/Plan  Patient tolerated session well with no reports of pain with exercises. Patient had mild reports of fatigue with exercises but no rest break was required. Patient with mild hypertonicity noted in thoracic paraspinals. Patient had reports of reduced stiffness at completion of session.       Progress per Plan of Care  PT will continue to monitor patients signs and symptoms and progress patient as tolerated.     Visit Diagnoses:    ICD-10-CM ICD-9-CM   1. Chronic right shoulder pain  M25.511 719.41    G89.29 338.29   2. H/O arthroscopy of shoulder  Z98.890 V45.89            Manual Therapy:    9     mins  90488;  Therapeutic Exercise:    38     mins  40411;     Neuromuscular Nick:        mins  64317;    Therapeutic Activity:          mins  21715;     Gait Training:           mins  70921;     Ultrasound:          mins  58473;    Electrical Stimulation:         mins  68211 ( );  Dry Needling          mins self-pay    Timed Treatment:   49   mins   Total Treatment:     59   mins          Chantal Coronado PT  Physical Therapist

## 2021-08-13 ENCOUNTER — TREATMENT (OUTPATIENT)
Dept: PHYSICAL THERAPY | Facility: CLINIC | Age: 51
End: 2021-08-13

## 2021-08-13 DIAGNOSIS — G89.29 CHRONIC RIGHT SHOULDER PAIN: Primary | ICD-10-CM

## 2021-08-13 DIAGNOSIS — M25.511 CHRONIC RIGHT SHOULDER PAIN: Primary | ICD-10-CM

## 2021-08-13 DIAGNOSIS — Z98.890 H/O ARTHROSCOPY OF SHOULDER: ICD-10-CM

## 2021-08-13 PROCEDURE — PTSPMIN2 PR PHYS THER SP 16 TO 30 MINUTES: Performed by: PHYSICAL THERAPIST

## 2021-08-13 NOTE — PROGRESS NOTES
Physical Therapy Daily Progress Note    Patient Information  Charli Le  1970      Visit # : 21    Charli Le reports 0/10 pain today at rest.  Pt reports soreness only the past couple days no pain.  The soreness is only from exercise activity.         Objective Pt presents to PT today with no distress noted.     Pt with good standing posture and mechanics.     A/AROM:  R shoulder flexion 170 degrees,  AROM:  ER 88 degrees    MMT:  R shoulder abd 4/5,  Flexion 4/5,  ER 4/5    He still has some limitations in fluidity of motion but he is able to move his shoulder better and he has improved strength    See Exercise, Manual, and Modality Logs for complete treatment.     Assessment/Plan  Pt is close to achieving all goals set at IE.  He is doing really well and just needs to continue with painfree activity and slowly progress activity.       Progress per Plan of Care and Progress strengthening /stabilization /functional activity      Visit Diagnoses:    ICD-10-CM ICD-9-CM   1. Chronic right shoulder pain  M25.511 719.41    G89.29 338.29   2. H/O arthroscopy of shoulder  Z98.890 V45.89            Manual Therapy:         mins  89609;  Therapeutic Exercise:         mins  66373;     Neuromuscular Nick:        mins  75461;    Therapeutic Activity:          mins  62461;     Gait Training:           mins  09436;     Ultrasound:          mins  01719;    Electrical Stimulation:         mins  47856 ( );  Dry Needling          mins self-pay    Timed Treatment:   50   mins   Total Treatment:     65   mins          Anup Massey, PT  Physical Therapist

## 2021-08-17 ENCOUNTER — TREATMENT (OUTPATIENT)
Dept: PHYSICAL THERAPY | Facility: CLINIC | Age: 51
End: 2021-08-17

## 2021-08-17 DIAGNOSIS — M25.511 CHRONIC RIGHT SHOULDER PAIN: Primary | ICD-10-CM

## 2021-08-17 DIAGNOSIS — Z98.890 H/O ARTHROSCOPY OF SHOULDER: ICD-10-CM

## 2021-08-17 DIAGNOSIS — G89.29 CHRONIC RIGHT SHOULDER PAIN: Primary | ICD-10-CM

## 2021-08-17 DIAGNOSIS — S29.019A THORACIC MYOFASCIAL STRAIN, INITIAL ENCOUNTER: ICD-10-CM

## 2021-08-17 PROCEDURE — PTSPVT PR CUSTOM PT TREATMENT OF SELF PAY PATIENT: Performed by: PHYSICAL THERAPIST

## 2021-08-17 NOTE — PROGRESS NOTES
Physical Therapy Daily Progress Note    Patient Information  Charli Le  1970      Visit # : 22    Charli Le reports 0/10 pain today at rest.  Pt reports just a little soreness since last visit.          Objective Pt presents to PT today with no distress noted.     R shoulder flexion supine 170 degrees flexion.        See Exercise, Manual, and Modality Logs for complete treatment.     Assessment/Plan  Pt with improved ROM and function.  He goes back to MD this week.        Progress per Plan of Care and Progress strengthening /stabilization /functional activity  Re-assess next visit.     Visit Diagnoses:    ICD-10-CM ICD-9-CM   1. Chronic right shoulder pain  M25.511 719.41    G89.29 338.29   2. H/O arthroscopy of shoulder  Z98.890 V45.89   3. Thoracic myofascial strain, initial encounter  S29.019A 847.1            Manual Therapy:         mins  51512;  Therapeutic Exercise:         mins  93137;     Neuromuscular Nick:        mins  82169;    Therapeutic Activity:          mins  17949;     Gait Training:           mins  08792;     Ultrasound:          mins  52852;    Electrical Stimulation:         mins  52773 ( );  Dry Needling          mins self-pay    Timed Treatment:   40   mins   Total Treatment:     40   mins          Anup Massey, PT  Physical Therapist

## 2021-08-27 ENCOUNTER — TREATMENT (OUTPATIENT)
Dept: PHYSICAL THERAPY | Facility: CLINIC | Age: 51
End: 2021-08-27

## 2021-08-27 DIAGNOSIS — Z98.890 H/O ARTHROSCOPY OF SHOULDER: Primary | ICD-10-CM

## 2021-08-27 PROCEDURE — PTSPVT PR CUSTOM PT TREATMENT OF SELF PAY PATIENT: Performed by: PHYSICAL THERAPIST

## 2021-08-27 NOTE — PROGRESS NOTES
Physical Therapy Daily Progress Note / Re-assessment    TOTAL TIME: 55 MINUTES    Charli Le reports: patient states he saw Dr. Paige and got a new order to continue with therapy; states shoulder is feeling really good; no pain at rest; motivated to progress strengthening as able and get back to gym      Objective   See Exercise, Manual, and Modality Logs for complete treatment.     THERAPEUTIC EXERCISES/ACTIVITIES ADDED TODAY: standing horizontal abduction with blue tband; red tband pull aparts, FM ext rot with 3# plate ; see previous there ex    Manual: right GH mobilizations, PROM ; IR mobilizations with movement HBB    Right shoulder strength grossly 4/5    Quick Dash assessment: 15.91    Assessment/Plan  Patient is making good progress towards full functional return; progressing well with mobility and strength; needs continued progression of ROM especially at end ranges, and progression of rotator cuff strengthening; typical course of rehab for this injury is 6-7 months, so patient is indicated for continued therapy    SHORT TERM GOALS:     4 weeks (From Initial Evaluation)   1. Pt I w/ HEP  2. Pt to demonstrate PROM of the right shoulder to WFL to improve ability to perform ADL's  3. Pt to demonstrate ability to perform 30 minutes continuous activity in the clinic without increase in pain     LONG TERM GOALS:   8 weeks  1. Pt to demonstrate AROM of the right shoulder to WNL to allow ability to perform all necessary functional activities (not met)  2. Pt to demonstrate ability to lift 5# OH with the right arm without increase in pain  3. Pt to report being able to work full duty without increase in pain in the shoulder  4. Pt to tolerate 60 minutes continuous activity in the clinic without increase in pain (not met)    New goals to include:  MMT of right UE to 5/5  Patient able to return to independent gym workouts without pain  Quick Dash score < 10    Progress per Plan of Care and Progress strengthening  /stabilization /functional activity           Manual Therapy:         mins  09475;  Therapeutic Exercise:         mins  48183;     Neuromuscular Nick:        mins  65705;    Therapeutic Activity:          mins  17413;     Gait Training:           mins  97426;     Ultrasound:          mins  15767;    Electrical Stimulation:         mins  81140 ( );  Dry Needling          mins self-pay    Timed Treatment:      mins   Total Treatment:     55   mins    AYAZ Aleman, PT  Physical Therapist

## 2021-09-07 ENCOUNTER — OFFICE VISIT (OUTPATIENT)
Dept: UROLOGY | Facility: CLINIC | Age: 51
End: 2021-09-07

## 2021-09-07 VITALS — WEIGHT: 236 LBS | RESPIRATION RATE: 16 BRPM | BODY MASS INDEX: 31.97 KG/M2 | HEIGHT: 72 IN

## 2021-09-07 DIAGNOSIS — R10.9 LEFT FLANK PAIN: Primary | ICD-10-CM

## 2021-09-07 DIAGNOSIS — N28.1 RENAL CYST: ICD-10-CM

## 2021-09-07 DIAGNOSIS — N28.9 RENAL LESION: ICD-10-CM

## 2021-09-07 LAB
BILIRUB BLD-MCNC: NEGATIVE MG/DL
CLARITY, POC: CLEAR
COLOR UR: YELLOW
GLUCOSE UR STRIP-MCNC: NEGATIVE MG/DL
KETONES UR QL: NEGATIVE
LEUKOCYTE EST, POC: NEGATIVE
NITRITE UR-MCNC: NEGATIVE MG/ML
PH UR: 6.5 [PH] (ref 5–8)
PROT UR STRIP-MCNC: NEGATIVE MG/DL
RBC # UR STRIP: NEGATIVE /UL
SP GR UR: 1 (ref 1–1.03)
UROBILINOGEN UR QL: NORMAL

## 2021-09-07 PROCEDURE — 81003 URINALYSIS AUTO W/O SCOPE: CPT | Performed by: UROLOGY

## 2021-09-07 PROCEDURE — 99214 OFFICE O/P EST MOD 30 MIN: CPT | Performed by: UROLOGY

## 2021-09-07 NOTE — PROGRESS NOTES
Chief Complaint  Left Flank Pain/for a week        HPI  Rey is a 51 y.o. male who resents today requesting urgent evaluation with 2 complaints.  He states he drank a cocktail that turned out to have moonshine and and he is concerned about any effect on his kidney function.  He also went a long interval without exercise because of 3 surgeries on his neck and shoulder and recently started resuming his workout.  He has now developed some left-sided flank and low back pain that are probably related to this new activity but are in the general area of the left kidney.  He was found to have a small cyst in the left kidney on the CT scan in 2017 and a ultrasound in 2020 showed a progression in size of the lesion.  At this point I would recommend formal evaluation with a tumor protocol CT scan in light of the symptoms and progression in size of the lesion.    Is also noted a 3 mm lesion on the shaft of his penis that is smooth and appears to be a small benign skin lesion    Vitals:    09/07/21 1308   Resp: 16       Past Medical History  Past Medical History:   Diagnosis Date   • Arthritis    • Deep vein thrombosis (CMS/HCC)    • Hemochromatosis    • Hemochromatosis    • Hypertension    • Migraine    • Sleep apnea    • Spine pain        Past Surgical History  Past Surgical History:   Procedure Laterality Date   • CERVICAL FUSION     • COLONOSCOPY     • EYE SURGERY      LASIX   • HAND SURGERY     • SPINAL FUSION     • US GUIDED FINE NEEDLE ASPIRATION  2/24/2021   • WRIST SURGERY         Medications  has a current medication list which includes the following prescription(s): alprazolam, benzoyl peroxide, gabapentin, losartan, sertraline, sildenafil, valacyclovir, warfarin, warfarin, doxycycline, and testosterone cypionate.      Allergies  No Known Allergies    Social History  Social History     Socioeconomic History   • Marital status:      Spouse name: Not on file   • Number of children: Not on file   • Years of  education: Not on file   • Highest education level: Not on file   Tobacco Use   • Smoking status: Never Smoker   • Smokeless tobacco: Never Used   Substance and Sexual Activity   • Alcohol use: No   • Drug use: No   • Sexual activity: Defer       Family History  He has no family history of bladder or kidney cancer  He has no family history of kidney stones      AUA Symptom Score:      Review of Systems  Review of Systems   Constitutional: Negative for activity change, appetite change, chills, fatigue, fever, unexpected weight gain and unexpected weight loss.   Respiratory: Negative for apnea, cough, chest tightness, shortness of breath, wheezing and stridor.    Cardiovascular: Negative for chest pain, palpitations and leg swelling.   Gastrointestinal: Negative for abdominal distention, abdominal pain, anal bleeding, blood in stool, constipation, diarrhea, nausea, rectal pain, vomiting, GERD and indigestion.   Genitourinary: Positive for erectile dysfunction. Negative for decreased libido, decreased urine volume, difficulty urinating, discharge, dysuria, flank pain, frequency, genital sores, hematuria, nocturia, penile pain, penile swelling, scrotal swelling, testicular pain, urgency and urinary incontinence.   Musculoskeletal: Positive for back pain. Negative for joint swelling.   Neurological: Negative for tremors, seizures, speech difficulty, weakness and numbness.   Psychiatric/Behavioral: Negative for agitation, decreased concentration, sleep disturbance, depressed mood and stress. The patient is not nervous/anxious.        Physical Exam  Physical Exam  Vitals reviewed.   Constitutional:       Appearance: He is well-developed.   HENT:      Head: Normocephalic and atraumatic.   Pulmonary:      Effort: Pulmonary effort is normal. No respiratory distress.   Abdominal:      General: There is no distension.      Palpations: Abdomen is soft. There is no mass.      Tenderness: There is no abdominal tenderness.       Hernia: No hernia is present.   Musculoskeletal:         General: Normal range of motion.      Cervical back: Normal range of motion.   Lymphadenopathy:      Cervical: No cervical adenopathy.   Skin:     General: Skin is warm and dry.   Neurological:      Mental Status: He is alert and oriented to person, place, and time.   Psychiatric:         Behavior: Behavior normal.         Labs Recent and today in the office:  Results for orders placed or performed in visit on 09/07/21   POC Urinalysis Dipstick, Automated    Specimen: Urine   Result Value Ref Range    Color Yellow Yellow, Straw, Dark Yellow, Marybeth    Clarity, UA Clear Clear    Specific Gravity  1.005 1.005 - 1.030    pH, Urine 6.5 5.0 - 8.0    Leukocytes Negative Negative    Nitrite, UA Negative Negative    Protein, POC Negative Negative mg/dL    Glucose, UA Negative Negative, 1000 mg/dL (3+) mg/dL    Ketones, UA Negative Negative    Urobilinogen, UA Normal Normal    Bilirubin Negative Negative    Blood, UA Negative Negative         Assessment & Plan  Renal lesion/left flank pain: Patient with a known lesion in the left kidney since 2017 that has been progressing in size and appears to be benign on ultrasound.  With left flank pain and no follow-up recently we will proceed with a tumor protocol CT scan.  Is concerned about impaired renal function will be explored with a creatinine today.

## 2021-09-08 LAB
BUN SERPL-MCNC: 10 MG/DL (ref 6–20)
BUN/CREAT SERPL: 10.9 (ref 7–25)
CALCIUM SERPL-MCNC: 9.5 MG/DL (ref 8.6–10.5)
CHLORIDE SERPL-SCNC: 102 MMOL/L (ref 98–107)
CO2 SERPL-SCNC: 24.3 MMOL/L (ref 22–29)
CREAT SERPL-MCNC: 0.92 MG/DL (ref 0.76–1.27)
GLUCOSE SERPL-MCNC: 93 MG/DL (ref 65–99)
POTASSIUM SERPL-SCNC: 4 MMOL/L (ref 3.5–5.2)
SODIUM SERPL-SCNC: 140 MMOL/L (ref 136–145)

## 2021-09-10 ENCOUNTER — TREATMENT (OUTPATIENT)
Dept: PHYSICAL THERAPY | Facility: CLINIC | Age: 51
End: 2021-09-10

## 2021-09-10 ENCOUNTER — HOSPITAL ENCOUNTER (OUTPATIENT)
Dept: CT IMAGING | Facility: HOSPITAL | Age: 51
Discharge: HOME | End: 2021-09-10
Admitting: UROLOGY

## 2021-09-10 DIAGNOSIS — Z98.890 H/O ARTHROSCOPY OF SHOULDER: Primary | ICD-10-CM

## 2021-09-10 PROCEDURE — 74170 CT ABD WO CNTRST FLWD CNTRST: CPT

## 2021-09-10 PROCEDURE — PTSPVT PR CUSTOM PT TREATMENT OF SELF PAY PATIENT: Performed by: PHYSICAL THERAPIST

## 2021-09-10 PROCEDURE — 25010000002 IOPAMIDOL 61 % SOLUTION: Performed by: UROLOGY

## 2021-09-10 RX ADMIN — IOPAMIDOL 100 ML: 612 INJECTION, SOLUTION INTRAVENOUS at 08:24

## 2021-09-10 NOTE — PROGRESS NOTES
Physical Therapy Daily Progress Note    TOTAL TIME: 50 MINUTES    Charli Nielsongale reports: shoulder is feeling good, have been having a lot of back spasms lately- I get those from time to time      Objective   See Exercise, Manual, and Modality Logs for complete treatment.     172 degrees of shoulder flexion    THERAPEUTIC EXERCISES/ACTIVITIES ADDED TODAY: 7# ball on wall bounce bilaterally, 4# ball bounce unilaterally; ball on wall walks, LTR, lat thigh stx, piriformis stx, hamstring stretch ; see flow sheets and previous hep      Assessment/Plan  Patient is making very good progress with RTC protocol to date; continue towards improved functional mobility and strength    Progress per Plan of Care and Progress strengthening /stabilization /functional activity           Manual Therapy:         mins  05477;  Therapeutic Exercise:         mins  41833;     Neuromuscular Nick:        mins  43995;    Therapeutic Activity:          mins  91016;     Gait Training:           mins  19914;     Ultrasound:          mins  54767;    Electrical Stimulation:         mins  15822 ( );  Dry Needling          mins self-pay    Timed Treatment:      mins   Total Treatment:     50   mins    AYAZ Aleman PT  Physical Therapist

## 2021-09-17 ENCOUNTER — TREATMENT (OUTPATIENT)
Dept: PHYSICAL THERAPY | Facility: CLINIC | Age: 51
End: 2021-09-17

## 2021-09-17 DIAGNOSIS — Z98.890 H/O ARTHROSCOPY OF SHOULDER: Primary | ICD-10-CM

## 2021-09-17 PROCEDURE — PTSPVT PR CUSTOM PT TREATMENT OF SELF PAY PATIENT: Performed by: PHYSICAL THERAPIST

## 2021-09-17 NOTE — PROGRESS NOTES
Physical Therapy Daily Progress Note    TOTAL TIME: 50 MINUTES    Charli Nielsongale reports: shoulder is feeling good; back has felt a lot better after stretching it out last time      Objective   See Exercise, Manual, and Modality Logs for complete treatment.     THERAPEUTIC EXERCISES/ACTIVITIES ADDED TODAY: see flow sheets; one arm row with rotation on counter top with blue tband     Manual: lumbar/hip mobs; hamstrings and piriformis stretch bilaterally     Assessment/Plan  Patient is making very good progress with RTC protocol to date; continue towards improved functional mobility and strength       Progress per Plan of Care and Progress strengthening /stabilization /functional activity           Manual Therapy:         mins  85643;  Therapeutic Exercise:         mins  19291;     Neuromuscular Nick:        mins  20360;    Therapeutic Activity:          mins  70716;     Gait Training:           mins  87620;     Ultrasound:          mins  62675;    Electrical Stimulation:         mins  84799 ( );  Dry Needling          mins self-pay    Timed Treatment:      mins   Total Treatment:     50   mins    AYAZ Aleman, PT  Physical Therapist

## 2021-09-27 ENCOUNTER — HOSPITAL ENCOUNTER (EMERGENCY)
Facility: HOSPITAL | Age: 51
Discharge: LEFT BEFORE DISCHARGE | End: 2021-09-27

## 2021-09-27 VITALS
WEIGHT: 231.6 LBS | RESPIRATION RATE: 18 BRPM | TEMPERATURE: 98.1 F | OXYGEN SATURATION: 97 % | HEART RATE: 93 BPM | SYSTOLIC BLOOD PRESSURE: 167 MMHG | BODY MASS INDEX: 31.37 KG/M2 | DIASTOLIC BLOOD PRESSURE: 104 MMHG | HEIGHT: 72 IN

## 2021-09-27 PROCEDURE — 99211 OFF/OP EST MAY X REQ PHY/QHP: CPT

## 2021-10-06 ENCOUNTER — TREATMENT (OUTPATIENT)
Dept: PHYSICAL THERAPY | Facility: CLINIC | Age: 51
End: 2021-10-06

## 2021-10-06 DIAGNOSIS — Z98.890 H/O ARTHROSCOPY OF SHOULDER: Primary | ICD-10-CM

## 2021-10-06 PROCEDURE — PTSPVT PR CUSTOM PT TREATMENT OF SELF PAY PATIENT: Performed by: PHYSICAL THERAPIST

## 2021-10-08 ENCOUNTER — TREATMENT (OUTPATIENT)
Dept: PHYSICAL THERAPY | Facility: CLINIC | Age: 51
End: 2021-10-08

## 2021-10-08 DIAGNOSIS — Z98.890 H/O ARTHROSCOPY OF SHOULDER: Primary | ICD-10-CM

## 2021-10-08 PROCEDURE — PTSPVT PR CUSTOM PT TREATMENT OF SELF PAY PATIENT: Performed by: PHYSICAL THERAPIST

## 2021-10-08 NOTE — PROGRESS NOTES
"Physical Therapy Daily Progress Note    TOTAL TIME: 55 MINUTES    Charli Le reports: no new complaints; shoulder feeling good; \"can we do some DN to my right thoracic area?\"- hx of 'hypertrophy' there from an old mm tear, had DN there before       Objective   See Exercise, Manual, and Modality Logs for complete treatment.     THERAPEUTIC EXERCISES/ACTIVITIES ADDED TODAY: see flow sheets     Right shoulder flexion to 175 degree, left shoulder 175 also    DN to right thoracic paraspinals with 4 30 mm needles, inserted only ~ 50% with estim x 2 channels x 5 minutes    Assessment/Plan  Patient has made excellent progress with rehab s/p shoulder arthroscopy; continue per POC    Progress per Plan of Care           Manual Therapy:         mins  37809;  Therapeutic Exercise:         mins  11041;     Neuromuscular Nick:        mins  90504;    Therapeutic Activity:          mins  99341;     Gait Training:           mins  87091;     Ultrasound:          mins  86372;    Electrical Stimulation:         mins  40022 ( );  Dry Needling          mins self-pay    Timed Treatment:      mins   Total Treatment:     55   mins    AYAZ Aleman, PT  Physical Therapist  "

## 2021-12-15 ENCOUNTER — TREATMENT (OUTPATIENT)
Dept: PHYSICAL THERAPY | Facility: CLINIC | Age: 51
End: 2021-12-15

## 2021-12-15 DIAGNOSIS — S29.019A THORACIC MYOFASCIAL STRAIN, INITIAL ENCOUNTER: Primary | ICD-10-CM

## 2021-12-15 PROCEDURE — 20561 NDL INSJ W/O NJX 3+ MUSC: CPT | Performed by: PHYSICAL THERAPIST

## 2021-12-15 NOTE — PROGRESS NOTES
"Physical Therapy Initial Evaluation and Plan of Care    TOTAL TIME: 50 MINUTES    Subjective Evaluation    History of Present Illness  Mechanism of injury: Patient presents with acute on chronic right sided mm spasms and hypertrophy or paraspinals    Injury occurred ~ 25 years ago, muscles were ripped and have been hypertrophied and intermittent spasms since then    Hx of 2 cervical fusions, recent right shoulder arthroscopy that is doing well overall    Mm have been in spasm for about 10 days    Hx of dry needling that helped a lot     Quality of life: good    Pain  Quality: tight, sharp and discomfort  Relieving factors: heat and change in position    Patient Goals  Patient goals for therapy: decreased pain and increased motion             Objective        Special Questions      Additional Special Questions  No red flags      Postural Observations  Seated posture: good  Standing posture: good        Palpation     Right   Hypertonic in the cervical paraspinals and middle trapezius. Tenderness of the cervical paraspinals and middle trapezius.     Neurological Testing     Sensation   Cervical/Thoracic   Left   Intact: light touch    Right   Intact: light touch    Reflexes   Left   Biceps (C5/C6): normal (2+)  Brachioradialis (C6): normal (2+)  Triceps (C7): normal (2+)    Right   Biceps (C5/C6): normal (2+)  Brachioradialis (C6): normal (2+)  Triceps (C7): normal (2+)    Active Range of Motion   Cervical/Thoracic Spine   Normal active range of motion    Strength/Myotome Testing   Cervical Spine   Neck extension: 5  Neck flexion: 5    Left   Normal strength    Right   Normal strength      Dry needlin 1\" needles inserted ~ 50% to right sided thoracic paraspinals with anteromedial direction; pre-modulated estim with needles to patient's comfortable level     Assessment & Plan     Assessment  Impairments: abnormal muscle tone, abnormal or restricted ROM and pain with function  Functional Limitations: uncomfortable " because of pain  Assessment details: Patient presents with acute on chronic right thoracic paraspinal tightness and pain; has had dry needling to the area in the past with very good results; had good response to treatment today, should benefit from continued dry needling to that area prn  Prognosis: good    Goals  Plan Goals: 1. Patient will report improved flexibility and decreased pain in thoracic musculature    Plan  Therapy options: will be seen for skilled therapy services  Planned modality interventions: dry needling and thermotherapy (hydrocollator packs)  Frequency: 2x week  Duration in weeks: 3  Treatment plan discussed with: patient        Manual Therapy:         mins  92609;  Therapeutic Exercise:         mins  31422;     Neuromuscular Nick:        mins  92775;    Therapeutic Activity:          mins  78586;     Gait Training:           mins  68203;     Ultrasound:          mins  91946;    Electrical Stimulation:         mins  96620 ( );  Dry Needling     30     mins self-pay [phone]    Timed Treatment:   30   mins   Total Treatment:     50   mins    PT SIGNATURE: AYAZ Aleman, PT   DATE TREATMENT INITIATED: 12/15/2021    Initial Certification  Certification Period: 3/15/2022  I certify that the therapy services are furnished while this patient is under my care.  The services outlined above are required by this patient, and will be reviewed every 90 days.     PHYSICIAN:       DATE:     Please sign and return via fax to  .. Thank you, [facility].

## 2021-12-17 ENCOUNTER — TREATMENT (OUTPATIENT)
Dept: PHYSICAL THERAPY | Facility: CLINIC | Age: 51
End: 2021-12-17

## 2021-12-17 DIAGNOSIS — S29.019A THORACIC MYOFASCIAL STRAIN, INITIAL ENCOUNTER: Primary | ICD-10-CM

## 2021-12-17 PROCEDURE — 20561 NDL INSJ W/O NJX 3+ MUSC: CPT | Performed by: PHYSICAL THERAPIST

## 2021-12-17 NOTE — PROGRESS NOTES
Physical Therapy Daily Progress Note    TOTAL TIME: 50 MINUTES    Charli Nielsongale reports: felt much better after last session; not as much tightness; was able to work out at gym last night       Objective   See Exercise, Manual, and Modality Logs for complete treatment.     THERAPEUTIC EXERCISES/ACTIVITIES ADDED TODAY: n/a    Dry needling: used (4) 30 mm needles inserted only ~ 50% to right thoracic p/s mm using 1:1 rule with anteromedial approach; combined with pre-mod tens to comfortable level    Assessment/Plan  Patient tolerated treatment very well; decreased hypertonicity to muscles; improved ROM     Progress per Plan of Care           Manual Therapy:         mins  38709;  Therapeutic Exercise:         mins  02748;     Neuromuscular Nick:        mins  69413;    Therapeutic Activity:          mins  09026;     Gait Training:           mins  61257;     Ultrasound:          mins  67284;    Electrical Stimulation:         mins  60823 ( );  Dry Needling     30     mins self-pay 20561    Timed Treatment:   30   mins   Total Treatment:     50   mins    AYAZ Aleman PT  Physical Therapist

## 2021-12-21 ENCOUNTER — TREATMENT (OUTPATIENT)
Dept: PHYSICAL THERAPY | Facility: CLINIC | Age: 51
End: 2021-12-21

## 2021-12-21 DIAGNOSIS — S29.019A THORACIC MYOFASCIAL STRAIN, INITIAL ENCOUNTER: Primary | ICD-10-CM

## 2021-12-21 PROCEDURE — 20561 NDL INSJ W/O NJX 3+ MUSC: CPT | Performed by: PHYSICAL THERAPIST

## 2021-12-23 NOTE — PROGRESS NOTES
Physical Therapy Daily Progress Note    TOTAL TIME: 30 MINUTES    Charli Nielsongale reports: patient is feeling better in right sided thoracic spine; spasm is decreased; has been able to return to gym a few times without issue      Objective   See Exercise, Manual, and Modality Logs for complete treatment.     THERAPEUTIC EXERCISES/ACTIVITIES ADDED TODAY: n/a    Dry needling to right thoracic spine p/s mm: 4 30 mm needles inserted ~ 50% with anteromedial approach with 1:1 method; pre mod estim x 15 minutes     Assessment/Plan  Patient has decreased thoracic mm spasm, no tenderness to palpation; progressing well with dry needling     Progress per Plan of Care           Manual Therapy:         mins  54124;  Therapeutic Exercise:         mins  17314;     Neuromuscular Nick:        mins  60387;    Therapeutic Activity:          mins  93777;     Gait Training:           mins  01184;     Ultrasound:          mins  60454;    Electrical Stimulation:         mins  75118 ( );  Dry Needling    15      mins self-pay    Timed Treatment:   15   mins   Total Treatment:     30   mins    AYAZ Aleman PT  Physical Therapist

## 2021-12-29 ENCOUNTER — OFFICE VISIT (OUTPATIENT)
Dept: UROLOGY | Facility: CLINIC | Age: 51
End: 2021-12-29

## 2021-12-29 VITALS
OXYGEN SATURATION: 94 % | SYSTOLIC BLOOD PRESSURE: 132 MMHG | WEIGHT: 231 LBS | BODY MASS INDEX: 31.29 KG/M2 | HEIGHT: 72 IN | TEMPERATURE: 97 F | HEART RATE: 111 BPM | DIASTOLIC BLOOD PRESSURE: 88 MMHG

## 2021-12-29 DIAGNOSIS — N52.9 ERECTILE DYSFUNCTION, UNSPECIFIED ERECTILE DYSFUNCTION TYPE: ICD-10-CM

## 2021-12-29 DIAGNOSIS — B37.9 CANDIDIASIS: ICD-10-CM

## 2021-12-29 DIAGNOSIS — R31.0 GROSS HEMATURIA: Primary | ICD-10-CM

## 2021-12-29 LAB
BILIRUB BLD-MCNC: NEGATIVE MG/DL
CLARITY, POC: CLEAR
COLOR UR: YELLOW
EXPIRATION DATE: NORMAL
GLUCOSE UR STRIP-MCNC: NEGATIVE MG/DL
KETONES UR QL: NEGATIVE
LEUKOCYTE EST, POC: NEGATIVE
Lab: NORMAL
NITRITE UR-MCNC: NEGATIVE MG/ML
PH UR: 6 [PH] (ref 5–8)
PROT UR STRIP-MCNC: NEGATIVE MG/DL
RBC # UR STRIP: NEGATIVE /UL
SP GR UR: 1.01 (ref 1–1.03)
UROBILINOGEN UR QL: NORMAL

## 2021-12-29 PROCEDURE — 81003 URINALYSIS AUTO W/O SCOPE: CPT | Performed by: UROLOGY

## 2021-12-29 PROCEDURE — 99214 OFFICE O/P EST MOD 30 MIN: CPT | Performed by: UROLOGY

## 2021-12-29 RX ORDER — NYSTATIN 100000 [USP'U]/G
POWDER TOPICAL 2 TIMES DAILY
Qty: 45 G | Refills: 1 | Status: SHIPPED | OUTPATIENT
Start: 2021-12-29 | End: 2022-01-12

## 2021-12-29 RX ORDER — SILDENAFIL CITRATE 20 MG/1
100 TABLET ORAL DAILY PRN
Qty: 60 TABLET | Refills: 11 | Status: SHIPPED | OUTPATIENT
Start: 2021-12-29

## 2021-12-29 NOTE — PROGRESS NOTES
Chief Complaint  Chief Complaint   Patient presents with   • Follow-up     Patient states that he sees blood when he wipes penis      HPI  Mr. Le is a 51 y.o. male with below past medical history who presents with bloody discharge from penis.    This happened after rough intercourse with wife recently. Patient felt pain and loss of erection.  He did not have any bruising or deformity.  He had some minor discomfort with urination for 1 day after, but denies any today.  He does not have any gross hematuria today.  He denies any lower urinary tract symptoms.  He does have chronic erectile dysfunction and 1 to 3 tablets of 20 mg sildenafil works well for this.  He desires a refill.    Past Medical History  Past Medical History:   Diagnosis Date   • Arthritis    • Deep vein thrombosis (HCC)    • Hemochromatosis    • Hemochromatosis    • Hypertension    • Migraine    • Sleep apnea    • Spine pain        Past Surgical History  Past Surgical History:   Procedure Laterality Date   • CERVICAL FUSION     • COLONOSCOPY     • EYE SURGERY      LASIX   • HAND SURGERY     • SHOULDER ROTATOR CUFF REPAIR     • SPINAL FUSION     • US GUIDED FINE NEEDLE ASPIRATION  2/24/2021   • WRIST SURGERY         Medications    Current Outpatient Medications:   •  ALPRAZolam (XANAX) 1 MG tablet, Tid prn, Disp: , Rfl: 0  •  gabapentin (NEURONTIN) 300 MG capsule, Tid takes it bid, Disp: , Rfl: 1  •  losartan (COZAAR) 50 MG tablet, , Disp: , Rfl: 0  •  sertraline (ZOLOFT) 100 MG tablet, , Disp: , Rfl: 1  •  sildenafil (REVATIO) 20 MG tablet, TAKE 1 TO 3 TABLETS DAILY AS NEEDED FOR ERECTILE DYSFUNCTION, Disp: 30 tablet, Rfl: 3  •  Testosterone Cypionate (DEPOTESTOTERONE CYPIONATE) 200 MG/ML injection, inject 1/2 milliliter every 2 weeks, Disp: , Rfl: 0  •  valACYclovir (VALTREX) 1000 MG tablet, Take 1 tablet by mouth Daily., Disp: 20 tablet, Rfl: 0  •  warfarin (COUMADIN) 5 MG tablet, , Disp: , Rfl: 0  •  warfarin (COUMADIN) 7.5 MG tablet,  "warfarin 7.5 mg tablet  Take 1 tablet every day by oral route., Disp: , Rfl:   •  benzoyl peroxide 5 % external liquid, Apply three times prior to surgery as directed., Disp: 148 g, Rfl: 0  •  doxycycline (VIBRAMYCIN) 100 MG capsule, Take 100 mg by mouth 2 (Two) Times a Day., Disp: , Rfl:     Allergies  No Known Allergies    Social History  Social History     Socioeconomic History   • Marital status:    Tobacco Use   • Smoking status: Never Smoker   • Smokeless tobacco: Never Used   Vaping Use   • Vaping Use: Never used   Substance and Sexual Activity   • Alcohol use: No   • Drug use: No   • Sexual activity: Defer       Family History  Family History   Family history unknown: Yes   Problem Relation Age of Onset   • Family history unknown: Yes   • Arthritis Mother    • Hypertension Mother    • Hyperlipidemia Mother    • Arthritis Father    • Cancer Father    • Hypertension Father        Physical Exam  Visit Vitals  /88   Pulse 111   Temp 97 °F (36.1 °C)   Ht 182.9 cm (72\")   Wt 105 kg (231 lb)   SpO2 94%   BMI 31.33 kg/m²     Physical exam was performed and was notable for mildly obese    Genitourinary  Penis: circumcised penis, glans normal, but he does have some synechiae/bands and narrowing of the urethra.  No penile discharge.  =  Testes: descended bilaterally, no masses, nontender to palpation. Remainder of scrotal contents normal. No hernia appreciated.  Scrotum very erythematous, consistent with candidiasis.  Patient admits to putting Neosporin all over penis and scrotum.    Labs  Lab Results   Component Value Date    PSA 1.080 01/25/2018       Lab Results   Component Value Date    GLUCOSE 93 09/07/2021    CALCIUM 9.5 09/07/2021     09/07/2021    K 4.0 09/07/2021    CO2 24.3 09/07/2021     09/07/2021    BUN 10 09/07/2021    CREATININE 0.92 09/07/2021    EGFRIFAFRI 104 09/07/2021    EGFRIFNONA 86 09/07/2021    BCR 10.9 09/07/2021    ANIONGAP 10.0 04/07/2021       Lab Results   Component " Value Date    WBC 3.56 04/07/2021    HGB 15.7 04/07/2021    HCT 46.8 04/07/2021    MCV 99.2 (H) 04/07/2021     04/07/2021       Brief Urine Lab Results  (Last result in the past 365 days)      Color   Clarity   Blood   Leuk Est   Nitrite   Protein   CREAT   Urine HCG        12/29/21 1513 Yellow   Clear   Negative   Negative   Negative   Negative                  Radiologic Studies  CT Abdomen With & Without Contrast    Result Date: 9/10/2021  Findings consistent with left renal cyst with no evidence of an enhancing renal mass or hydronephrosis.  1354.30 mGy.cm   This study was performed with techniques to keep radiation doses as low as reasonably achievable (ALARA). Individualized dose reduction techniques using automated exposure control or adjustment of mA and/or kV according to the patient size were employed.  This report was finalized on 9/10/2021 9:31 AM by Zachary Villarreal M.D..        Assessment  Mr. Le is a 51 y.o. male who presents with chronic erectile dysfunction, and recent penile trauma during rough intercourse with possible tearing of anal skin or urethral meatus.  He does not have any gross hematuria or difficulty urinating today.  Physical exam is relatively normal except for scrotal candidiasis, with some mild narrowing.  He is overall urinating well and has minimal LUTS.    Plan  1.  I refilled his sildenafil.  We discussed potential side effects and his current dosing.  2..  I sent a prescription for nystatin for his likely fungal rash of his scrotum.      Honorio Santoyo MD

## 2022-01-10 ENCOUNTER — TELEPHONE (OUTPATIENT)
Dept: UROLOGY | Facility: CLINIC | Age: 52
End: 2022-01-10

## 2022-01-27 ENCOUNTER — TREATMENT (OUTPATIENT)
Dept: PHYSICAL THERAPY | Facility: CLINIC | Age: 52
End: 2022-01-27

## 2022-01-27 DIAGNOSIS — S29.019A THORACIC MYOFASCIAL STRAIN, INITIAL ENCOUNTER: Primary | ICD-10-CM

## 2022-01-27 PROCEDURE — 20561 NDL INSJ W/O NJX 3+ MUSC: CPT | Performed by: PHYSICAL THERAPIST

## 2022-01-28 ENCOUNTER — TREATMENT (OUTPATIENT)
Dept: PHYSICAL THERAPY | Facility: CLINIC | Age: 52
End: 2022-01-28

## 2022-01-28 DIAGNOSIS — S29.019A THORACIC MYOFASCIAL STRAIN, INITIAL ENCOUNTER: Primary | ICD-10-CM

## 2022-01-28 PROCEDURE — 20561 NDL INSJ W/O NJX 3+ MUSC: CPT | Performed by: PHYSICAL THERAPIST

## 2022-01-28 NOTE — PROGRESS NOTES
Physical Therapy Daily Progress Note    TOTAL TIME: 30 MINUTES    Charli Nielsongale reports: patient reports the same spasm in his right side thoracic spine       Objective   See Exercise, Manual, and Modality Logs for complete treatment.     THERAPEUTIC EXERCISES/ACTIVITIES ADDED TODAY: n/a    Dry needling to right thoracic spine p/s mm: 6, 30 mm needles inserted ~ 50% with anteromedial approach with 1:1 method; pre mod estim x 15 minutes      Assessment/Plan  Patient tolerated treatment well; felt better at end of session; reported less spasm    Progress per Plan of Care f/u as needed for thoracic mm spasm           Manual Therapy:         mins  33660;  Therapeutic Exercise:         mins  23672;     Neuromuscular Nick:        mins  62295;    Therapeutic Activity:          mins  21032;     Gait Training:           mins  98796;     Ultrasound:          mins  65856;    Electrical Stimulation:         mins  25990 ( );  Dry Needling     30     mins self-pay    Timed Treatment:   30   mins   Total Treatment:     30   mins    AYAZ Aleman, PT  Physical Therapist

## 2022-01-30 NOTE — PROGRESS NOTES
Physical Therapy Daily Progress Note    TOTAL TIME: 30 MINUTES    Charli Nielsongale reports: feels better after DN session yesterday; not as much spasm      Objective   See Exercise, Manual, and Modality Logs for complete treatment.     THERAPEUTIC EXERCISES/ACTIVITIES ADDED TODAY: n/a    Dry needling to right thoracic spine p/s mm: 6, 30 mm needles inserted ~ 50% with anteromedial approach with 1:1 method; pre mod estim x 15 minutes        Assessment/Plan  Assessment/Plan  Patient tolerated treatment well; felt better at end of session; reported less spasm    Progress per Plan of Care           Manual Therapy:         mins  52282;  Therapeutic Exercise:         mins  34701;     Neuromuscular Nick:        mins  74654;    Therapeutic Activity:          mins  33105;     Gait Training:           mins  72061;     Ultrasound:          mins  26408;    Electrical Stimulation:         mins  44534 ( );  Dry Needling    30      mins self-pay    Timed Treatment:   30   mins   Total Treatment:     30   mins    AYAZ Aleman PT  Physical Therapist

## 2022-01-31 ENCOUNTER — APPOINTMENT (OUTPATIENT)
Dept: GENERAL RADIOLOGY | Facility: HOSPITAL | Age: 52
End: 2022-01-31

## 2022-01-31 ENCOUNTER — APPOINTMENT (OUTPATIENT)
Dept: CT IMAGING | Facility: HOSPITAL | Age: 52
End: 2022-01-31

## 2022-01-31 ENCOUNTER — HOSPITAL ENCOUNTER (EMERGENCY)
Facility: HOSPITAL | Age: 52
Discharge: HOME | End: 2022-01-31
Attending: EMERGENCY MEDICINE | Admitting: EMERGENCY MEDICINE

## 2022-01-31 VITALS
HEART RATE: 80 BPM | RESPIRATION RATE: 20 BRPM | BODY MASS INDEX: 30.48 KG/M2 | WEIGHT: 225 LBS | TEMPERATURE: 98.8 F | SYSTOLIC BLOOD PRESSURE: 141 MMHG | HEIGHT: 72 IN | DIASTOLIC BLOOD PRESSURE: 99 MMHG | OXYGEN SATURATION: 98 %

## 2022-01-31 DIAGNOSIS — R00.2 PALPITATIONS: Primary | ICD-10-CM

## 2022-01-31 DIAGNOSIS — R07.9 CHEST PAIN, UNSPECIFIED TYPE: ICD-10-CM

## 2022-01-31 LAB
ALBUMIN SERPL-MCNC: 4.2 G/DL (ref 3.5–5.2)
ALBUMIN/GLOB SERPL: 1.9 G/DL
ALP SERPL-CCNC: 53 U/L (ref 39–117)
ALT SERPL W P-5'-P-CCNC: 40 U/L (ref 1–41)
ANION GAP SERPL CALCULATED.3IONS-SCNC: 11.5 MMOL/L (ref 5–15)
AST SERPL-CCNC: 37 U/L (ref 1–40)
BASOPHILS # BLD AUTO: 0.03 10*3/MM3 (ref 0–0.2)
BASOPHILS NFR BLD AUTO: 0.6 % (ref 0–1.5)
BILIRUB SERPL-MCNC: 0.3 MG/DL (ref 0–1.2)
BUN SERPL-MCNC: 8 MG/DL (ref 6–20)
BUN/CREAT SERPL: 10.5 (ref 7–25)
CALCIUM SPEC-SCNC: 9.1 MG/DL (ref 8.6–10.5)
CHLORIDE SERPL-SCNC: 106 MMOL/L (ref 98–107)
CO2 SERPL-SCNC: 22.5 MMOL/L (ref 22–29)
CREAT SERPL-MCNC: 0.76 MG/DL (ref 0.76–1.27)
DEPRECATED RDW RBC AUTO: 53.2 FL (ref 37–54)
EOSINOPHIL # BLD AUTO: 0.06 10*3/MM3 (ref 0–0.4)
EOSINOPHIL NFR BLD AUTO: 1.2 % (ref 0.3–6.2)
ERYTHROCYTE [DISTWIDTH] IN BLOOD BY AUTOMATED COUNT: 13.8 % (ref 12.3–15.4)
GFR SERPL CREATININE-BSD FRML MDRD: 107 ML/MIN/1.73
GLOBULIN UR ELPH-MCNC: 2.2 GM/DL
GLUCOSE SERPL-MCNC: 109 MG/DL (ref 65–99)
HCT VFR BLD AUTO: 50.2 % (ref 37.5–51)
HGB BLD-MCNC: 16.7 G/DL (ref 13–17.7)
HOLD SPECIMEN: NORMAL
HOLD SPECIMEN: NORMAL
IMM GRANULOCYTES # BLD AUTO: 0.03 10*3/MM3 (ref 0–0.05)
IMM GRANULOCYTES NFR BLD AUTO: 0.6 % (ref 0–0.5)
INR PPP: 1.97 (ref 0.9–1.1)
LYMPHOCYTES # BLD AUTO: 1.11 10*3/MM3 (ref 0.7–3.1)
LYMPHOCYTES NFR BLD AUTO: 21.6 % (ref 19.6–45.3)
MAGNESIUM SERPL-MCNC: 1.9 MG/DL (ref 1.6–2.6)
MCH RBC QN AUTO: 34.2 PG (ref 26.6–33)
MCHC RBC AUTO-ENTMCNC: 33.3 G/DL (ref 31.5–35.7)
MCV RBC AUTO: 102.7 FL (ref 79–97)
MONOCYTES # BLD AUTO: 0.43 10*3/MM3 (ref 0.1–0.9)
MONOCYTES NFR BLD AUTO: 8.4 % (ref 5–12)
NEUTROPHILS NFR BLD AUTO: 3.48 10*3/MM3 (ref 1.7–7)
NEUTROPHILS NFR BLD AUTO: 67.6 % (ref 42.7–76)
NRBC BLD AUTO-RTO: 0 /100 WBC (ref 0–0.2)
NT-PROBNP SERPL-MCNC: 12.1 PG/ML (ref 0–900)
PLATELET # BLD AUTO: 127 10*3/MM3 (ref 140–450)
PMV BLD AUTO: 9.8 FL (ref 6–12)
POTASSIUM SERPL-SCNC: 4 MMOL/L (ref 3.5–5.2)
PROT SERPL-MCNC: 6.4 G/DL (ref 6–8.5)
PROTHROMBIN TIME: 23 SECONDS (ref 12–15.1)
RBC # BLD AUTO: 4.89 10*6/MM3 (ref 4.14–5.8)
SODIUM SERPL-SCNC: 140 MMOL/L (ref 136–145)
TROPONIN T SERPL-MCNC: <0.01 NG/ML (ref 0–0.03)
WBC NRBC COR # BLD: 5.14 10*3/MM3 (ref 3.4–10.8)
WHOLE BLOOD HOLD SPECIMEN: NORMAL
WHOLE BLOOD HOLD SPECIMEN: NORMAL

## 2022-01-31 PROCEDURE — 83880 ASSAY OF NATRIURETIC PEPTIDE: CPT | Performed by: EMERGENCY MEDICINE

## 2022-01-31 PROCEDURE — 71275 CT ANGIOGRAPHY CHEST: CPT

## 2022-01-31 PROCEDURE — 80053 COMPREHEN METABOLIC PANEL: CPT | Performed by: EMERGENCY MEDICINE

## 2022-01-31 PROCEDURE — 84484 ASSAY OF TROPONIN QUANT: CPT | Performed by: EMERGENCY MEDICINE

## 2022-01-31 PROCEDURE — 83735 ASSAY OF MAGNESIUM: CPT | Performed by: EMERGENCY MEDICINE

## 2022-01-31 PROCEDURE — 36415 COLL VENOUS BLD VENIPUNCTURE: CPT

## 2022-01-31 PROCEDURE — 25010000002 IOPAMIDOL 61 % SOLUTION: Performed by: EMERGENCY MEDICINE

## 2022-01-31 PROCEDURE — 85610 PROTHROMBIN TIME: CPT | Performed by: EMERGENCY MEDICINE

## 2022-01-31 PROCEDURE — 85025 COMPLETE CBC W/AUTO DIFF WBC: CPT | Performed by: EMERGENCY MEDICINE

## 2022-01-31 PROCEDURE — 99283 EMERGENCY DEPT VISIT LOW MDM: CPT

## 2022-01-31 PROCEDURE — 93005 ELECTROCARDIOGRAM TRACING: CPT | Performed by: EMERGENCY MEDICINE

## 2022-01-31 RX ORDER — SODIUM CHLORIDE 0.9 % (FLUSH) 0.9 %
10 SYRINGE (ML) INJECTION AS NEEDED
Status: DISCONTINUED | OUTPATIENT
Start: 2022-01-31 | End: 2022-01-31 | Stop reason: HOSPADM

## 2022-01-31 RX ORDER — METOPROLOL TARTRATE 5 MG/5ML
5 INJECTION INTRAVENOUS ONCE
Status: DISCONTINUED | OUTPATIENT
Start: 2022-01-31 | End: 2022-01-31 | Stop reason: HOSPADM

## 2022-01-31 RX ADMIN — SODIUM CHLORIDE 1000 ML: 9 INJECTION, SOLUTION INTRAVENOUS at 10:54

## 2022-01-31 RX ADMIN — IOPAMIDOL 100 ML: 612 INJECTION, SOLUTION INTRAVENOUS at 10:44

## 2022-02-02 ENCOUNTER — TREATMENT (OUTPATIENT)
Dept: PHYSICAL THERAPY | Facility: CLINIC | Age: 52
End: 2022-02-02

## 2022-02-02 DIAGNOSIS — S29.019A THORACIC MYOFASCIAL STRAIN, INITIAL ENCOUNTER: Primary | ICD-10-CM

## 2022-02-02 PROCEDURE — 20561 NDL INSJ W/O NJX 3+ MUSC: CPT | Performed by: PHYSICAL THERAPIST

## 2022-02-02 NOTE — PROGRESS NOTES
Physical Therapy Daily Progress Note    TOTAL TIME: 30 MINUTES    Charli Nielsongale reports: got massage over the weekend ; felt good but still really locked up; lumbar spine is tight as well, pain with getting out of bed       Objective       THERAPEUTIC EXERCISES/ACTIVITIES ADDED TODAY: n/a    Dry needling to right thoracic/lumbar spine p/s mm: 6, 30 mm needles inserted ~ 50% with anteromedial approach with 1:1 method; pre mod estim x 15 minutes         Assessment/Plan  Patient tolerated treatment well; felt better at end of session; reported less spasm       Progress per Plan of Care and Progress strengthening /stabilization /functional activity           Manual Therapy:         mins  54189;  Therapeutic Exercise:         mins  99370;     Neuromuscular Nick:        mins  70467;    Therapeutic Activity:          mins  21921;     Gait Training:           mins  59746;     Ultrasound:          mins  51992;    Electrical Stimulation:         mins  26167 ( );  Dry Needling     30     mins self-pay    Timed Treatment:   30   mins   Total Treatment:     30   mins    AYAZ Aleman PT  Physical Therapist

## 2022-02-11 ENCOUNTER — TREATMENT (OUTPATIENT)
Dept: PHYSICAL THERAPY | Facility: CLINIC | Age: 52
End: 2022-02-11

## 2022-02-11 DIAGNOSIS — S29.019A THORACIC MYOFASCIAL STRAIN, INITIAL ENCOUNTER: Primary | ICD-10-CM

## 2022-02-11 PROCEDURE — 20561 NDL INSJ W/O NJX 3+ MUSC: CPT | Performed by: PHYSICAL THERAPIST

## 2022-02-11 NOTE — PROGRESS NOTES
Physical Therapy Daily Progress Note    TOTAL TIME: 30 MINUTES    Charli Le reports: dry needling seems to be helping, but it feels pretty locked up today; sitting a lot seems to bother it      Objective   See Exercise, Manual, and Modality Logs for complete treatment.     THERAPEUTIC EXERCISES/ACTIVITIES ADDED TODAY: n/a    Dry needling to right thoracic/lumbar spine p/s mm: 6, 30 mm needles inserted ~ 50% with anteromedial approach with 1:1 method; pre mod estim x 15 minutes     Assessment/Plan  Patient tolerated treatment well; felt better at end of session; reported less spasm       Progress per Plan of Care           Manual Therapy:         mins  11327;  Therapeutic Exercise:         mins  66348;     Neuromuscular Nick:        mins  78655;    Therapeutic Activity:          mins  83667;     Gait Training:           mins  28925;     Ultrasound:          mins  80305;    Electrical Stimulation:         mins  52189 ( );  Dry Needling     30     mins self-pay    Timed Treatment:   30   mins   Total Treatment:     30   mins    AYAZ Aleman PT  Physical Therapist

## 2022-02-17 ENCOUNTER — TREATMENT (OUTPATIENT)
Dept: PHYSICAL THERAPY | Facility: CLINIC | Age: 52
End: 2022-02-17

## 2022-02-17 DIAGNOSIS — S29.019A THORACIC MYOFASCIAL STRAIN, INITIAL ENCOUNTER: Primary | ICD-10-CM

## 2022-02-17 PROCEDURE — 20561 NDL INSJ W/O NJX 3+ MUSC: CPT | Performed by: PHYSICAL THERAPIST

## 2022-02-21 NOTE — PROGRESS NOTES
Physical Therapy Daily Progress Note    TOTAL TIME: 30 MINUTES    Charli Rey reports: back pain seems to be getting worse even with all the treatments I'm getting; getting MRI's of all levels of spine; pain with getting out of bed primarily       Objective   See Exercise, Manual, and Modality Logs for complete treatment.     THERAPEUTIC EXERCISES/ACTIVITIES ADDED TODAY: na    Dry needling to right thoracic/lumbar spine p/s mm: 6, 30 mm needles inserted ~ 50% with anteromedial approach with 1:1 method; pre mod estim x 15 minutes        Assessment/Plan  Patient tolerated treatment well; felt better at end of session; reported less spasm    Progress per Plan of Care           Manual Therapy:         mins  45666;  Therapeutic Exercise:         mins  98272;     Neuromuscular Nick:        mins  12276;    Therapeutic Activity:          mins  54719;     Gait Training:           mins  76598;     Ultrasound:          mins  64623;    Electrical Stimulation:         mins  52942 ( );  Dry Needling     30     mins self-pay    Timed Treatment:   30   mins   Total Treatment:     30   mins    AYAZ Aleman PT  Physical Therapist

## 2022-05-25 NOTE — ED PROVIDER NOTES
"Subjective   This patient states about 30 to 45 minutes ago he had onset of \"numbness\" to the corner of his left mouth with some swelling to the mucosal surface.  No known injury, did not bite his lip, no shortness of breath or sensation of throat swelling.  He does have a history of multiple DVTs and is on Coumadin.  He states his INR was checked last week and it was 1.6.  He takes 7.5 mg of Coumadin on Tuesday Thursday and 5 mg every other day.  He has no headache, visual changes, focal weakness in his extremities.  His gait steady.  No new foods today or anything that he is eaten that he knows he is allergic to.          Review of Systems   Constitutional: Negative.    HENT:        Swelling to the inner lower left lip and angle of the mouth on the mucosal surface   Eyes: Negative.    Respiratory: Negative.    Cardiovascular: Negative.    Gastrointestinal: Negative.    Genitourinary: Negative.    Musculoskeletal: Negative.    Skin: Negative.    Allergic/Immunologic: Negative.    Neurological: Negative for dizziness, syncope, speech difficulty, weakness and headaches.   Psychiatric/Behavioral: Negative.    All other systems reviewed and are negative.      Past Medical History:   Diagnosis Date   • Arthritis    • Deep vein thrombosis (CMS/HCC)    • Hemochromatosis    • Hemochromatosis    • Hypertension    • Migraine    • Sleep apnea        No Known Allergies    Past Surgical History:   Procedure Laterality Date   • COLONOSCOPY     • EYE SURGERY      LASIX   • HAND SURGERY     • SPINAL FUSION     • WRIST SURGERY         Family History   Family history unknown: Yes   Problem Relation Age of Onset   • Family history unknown: Yes   • Arthritis Mother    • Hypertension Mother    • Hyperlipidemia Mother    • Arthritis Father    • Cancer Father    • Hypertension Father        Social History     Socioeconomic History   • Marital status:      Spouse name: Not on file   • Number of children: Not on file   • Years of " education: Not on file   • Highest education level: Not on file   Tobacco Use   • Smoking status: Never Smoker   • Smokeless tobacco: Never Used   Substance and Sexual Activity   • Alcohol use: No   • Drug use: No   • Sexual activity: Defer           Objective   Physical Exam   Constitutional: He is oriented to person, place, and time. He appears well-developed and well-nourished.   HENT:   Head: Normocephalic and atraumatic.   There is mild soft tissue swelling/edema to the inner lower left lip mucosal surface and angle of the mouth.   Eyes: Pupils are equal, round, and reactive to light. EOM are normal.   Neck: Normal range of motion.   Cardiovascular: Normal rate and regular rhythm.   Pulmonary/Chest: Effort normal.   Abdominal: Soft.   Musculoskeletal: Normal range of motion.   Neurological: He is alert and oriented to person, place, and time. He has normal strength. No cranial nerve deficit or sensory deficit. Coordination normal. GCS eye subscore is 4. GCS verbal subscore is 5. GCS motor subscore is 6.   Normal finger-nose testing bilaterally   Skin: Skin is warm and dry.   Psychiatric: He has a normal mood and affect. His behavior is normal. Thought content normal.   Nursing note and vitals reviewed.      Procedures           ED Course      2:28 PM  At this time patient states his symptoms are at least or more than 50% improved.  Discussed with Dr. Menchaca and he spoke with patient as well.  Safe for discharge.  Will encourage Benadryl at home and follow-up with allergist.                                         MDM    Final diagnoses:   Minor allergic reaction, initial encounter            Rosas White PA-C  12/25/19 4141     Cyclosporine Pregnancy And Lactation Text: This medication is Pregnancy Category C and it isn't know if it is safe during pregnancy. This medication is excreted in breast milk.

## 2023-10-05 NOTE — PROGRESS NOTES
Physical Therapy Daily Progress Note    TOTAL TIME: 60 MINUTES    Charli Nielsongale reports: patient reports he saw his ortho for f/u and was released- told he could go back to the gym      Objective   See Exercise, Manual, and Modality Logs for complete treatment.     THERAPEUTIC EXERCISES/ACTIVITIES ADDED TODAY: see flow sheets    Manual: lumbar, hip ER/IR, HS stx    MMT: 5/5 bilateral UE's throughout     Right shoulder flexion = 175 deg    Assessment/Plan  Patient is doing very well with functional mobility and strength; patient states he would like to f/u with PT a couple of more visits before transitioning back to the gym    Progress per Plan of Care           Manual Therapy:         mins  93192;  Therapeutic Exercise:         mins  84976;     Neuromuscular Nick:        mins  34900;    Therapeutic Activity:          mins  28505;     Gait Training:           mins  10138;     Ultrasound:          mins  21438;    Electrical Stimulation:         mins  43638 ( );  Dry Needling          mins self-pay    Timed Treatment:      mins   Total Treatment:     60   mins    AYAZ Aleman PT  Physical Therapist   Refill request is for a maintenance medication and has met the criteria specified in the Ambulatory Medication Refill Standing Order for eligibility, visits, laboratory, alerts and was sent to the requested pharmacy. Requested Prescriptions     Signed Prescriptions Disp Refills    acyclovir 400 MG Oral Tab 30 tablet 5     Sig: Take 1 tablet (400 mg total) by mouth 3 (three) times daily.  PRN     Authorizing Provider: Neel Crespo     Ordering User: Rose Mary Sorto

## 2024-03-05 ENCOUNTER — TRANSCRIBE ORDERS (OUTPATIENT)
Dept: ADMINISTRATIVE | Facility: HOSPITAL | Age: 54
End: 2024-03-05

## 2024-03-05 DIAGNOSIS — R74.01 NONSPECIFIC ELEVATION OF LEVELS OF TRANSAMINASE OR LACTIC ACID DEHYDROGENASE (LDH): Primary | ICD-10-CM

## 2024-03-05 DIAGNOSIS — R74.02 NONSPECIFIC ELEVATION OF LEVELS OF TRANSAMINASE OR LACTIC ACID DEHYDROGENASE (LDH): Primary | ICD-10-CM

## 2024-03-14 ENCOUNTER — HOSPITAL ENCOUNTER (OUTPATIENT)
Dept: INFUSION THERAPY | Facility: HOSPITAL | Age: 54
Discharge: HOME | End: 2024-03-14
Admitting: NURSE PRACTITIONER
Payer: COMMERCIAL

## 2024-03-14 VITALS
OXYGEN SATURATION: 98 % | DIASTOLIC BLOOD PRESSURE: 86 MMHG | SYSTOLIC BLOOD PRESSURE: 143 MMHG | TEMPERATURE: 98 F | RESPIRATION RATE: 18 BRPM | HEART RATE: 94 BPM

## 2024-03-14 DIAGNOSIS — E83.110 HEMOCHROMATOSIS, HEREDITARY: Primary | ICD-10-CM

## 2024-03-14 LAB
FERRITIN SERPL-MCNC: 1129 NG/ML (ref 30–400)
HCT VFR BLD AUTO: 53.3 % (ref 37.5–51)
HGB BLD-MCNC: 18.6 G/DL (ref 13–17.7)

## 2024-03-14 PROCEDURE — 85014 HEMATOCRIT: CPT | Performed by: NURSE PRACTITIONER

## 2024-03-14 PROCEDURE — 99195 PHLEBOTOMY: CPT

## 2024-03-14 PROCEDURE — 82728 ASSAY OF FERRITIN: CPT | Performed by: NURSE PRACTITIONER

## 2024-03-14 PROCEDURE — 85018 HEMOGLOBIN: CPT | Performed by: NURSE PRACTITIONER

## 2024-03-14 RX ORDER — SODIUM CHLORIDE 9 MG/ML
250 INJECTION, SOLUTION INTRAVENOUS ONCE
OUTPATIENT
Start: 2024-03-21

## 2024-03-14 RX ORDER — SODIUM CHLORIDE 9 MG/ML
250 INJECTION, SOLUTION INTRAVENOUS ONCE
Status: DISCONTINUED | OUTPATIENT
Start: 2024-03-14 | End: 2024-03-16 | Stop reason: HOSPADM

## 2024-04-13 ENCOUNTER — TRANSCRIBE ORDERS (OUTPATIENT)
Dept: INFUSION THERAPY | Facility: HOSPITAL | Age: 54
End: 2024-04-13
Payer: COMMERCIAL

## 2024-04-13 DIAGNOSIS — E83.110 PRIMARY HEREDITARY HEMOCHROMATOSIS: Primary | ICD-10-CM

## 2024-04-25 ENCOUNTER — HOSPITAL ENCOUNTER (OUTPATIENT)
Dept: INFUSION THERAPY | Facility: HOSPITAL | Age: 54
Discharge: HOME | End: 2024-04-25
Admitting: NURSE PRACTITIONER
Payer: COMMERCIAL

## 2024-04-25 VITALS
SYSTOLIC BLOOD PRESSURE: 176 MMHG | OXYGEN SATURATION: 98 % | DIASTOLIC BLOOD PRESSURE: 93 MMHG | HEART RATE: 101 BPM | TEMPERATURE: 97.9 F | RESPIRATION RATE: 18 BRPM

## 2024-04-25 DIAGNOSIS — E83.110 HEMOCHROMATOSIS, HEREDITARY: Primary | ICD-10-CM

## 2024-04-25 LAB
FERRITIN SERPL-MCNC: 1143 NG/ML (ref 30–400)
HCT VFR BLD AUTO: 41.7 % (ref 37.5–51)
HGB BLD-MCNC: 14.6 G/DL (ref 13–17.7)

## 2024-04-25 PROCEDURE — 85014 HEMATOCRIT: CPT | Performed by: INTERNAL MEDICINE

## 2024-04-25 PROCEDURE — 82728 ASSAY OF FERRITIN: CPT | Performed by: INTERNAL MEDICINE

## 2024-04-25 PROCEDURE — 36415 COLL VENOUS BLD VENIPUNCTURE: CPT

## 2024-04-25 PROCEDURE — 99195 PHLEBOTOMY: CPT

## 2024-04-25 PROCEDURE — 85018 HEMOGLOBIN: CPT | Performed by: INTERNAL MEDICINE

## 2024-04-25 RX ORDER — SODIUM CHLORIDE 9 MG/ML
250 INJECTION, SOLUTION INTRAVENOUS ONCE
Status: DISCONTINUED | OUTPATIENT
Start: 2024-04-25 | End: 2024-04-27 | Stop reason: HOSPADM

## 2024-04-25 RX ORDER — SODIUM CHLORIDE 9 MG/ML
250 INJECTION, SOLUTION INTRAVENOUS ONCE
OUTPATIENT
Start: 2024-05-02

## 2024-04-25 NOTE — CODE DOCUMENTATION
Venipuncture to left antecubital for lab draw; #18 gauge cathlon inserted for possible therapeutic phlebotomy.  Lab specimens to inpatient lab for processing.  Patient tolerated procedure well.

## 2024-04-26 ENCOUNTER — HOSPITAL ENCOUNTER (OUTPATIENT)
Dept: ULTRASOUND IMAGING | Facility: HOSPITAL | Age: 54
Discharge: HOME | End: 2024-04-26
Admitting: NURSE PRACTITIONER
Payer: COMMERCIAL

## 2024-04-26 DIAGNOSIS — R74.02 NONSPECIFIC ELEVATION OF LEVELS OF TRANSAMINASE OR LACTIC ACID DEHYDROGENASE (LDH): ICD-10-CM

## 2024-04-26 DIAGNOSIS — R74.01 NONSPECIFIC ELEVATION OF LEVELS OF TRANSAMINASE OR LACTIC ACID DEHYDROGENASE (LDH): ICD-10-CM

## 2024-04-26 PROCEDURE — 76700 US EXAM ABDOM COMPLETE: CPT

## 2024-06-26 ENCOUNTER — TRANSCRIBE ORDERS (OUTPATIENT)
Dept: HOSPITALIST | Facility: HOSPITAL | Age: 54
End: 2024-06-26
Payer: COMMERCIAL

## 2024-06-26 DIAGNOSIS — R19.7 DIARRHEA, UNSPECIFIED TYPE: ICD-10-CM

## 2024-06-26 DIAGNOSIS — E83.110 HEREDITARY HEMOCHROMATOSIS: Primary | ICD-10-CM

## 2024-07-10 ENCOUNTER — HOSPITAL ENCOUNTER (OUTPATIENT)
Dept: CT IMAGING | Facility: HOSPITAL | Age: 54
Discharge: HOME | End: 2024-07-10
Admitting: INTERNAL MEDICINE
Payer: COMMERCIAL

## 2024-07-10 DIAGNOSIS — R19.7 DIARRHEA, UNSPECIFIED TYPE: ICD-10-CM

## 2024-07-10 DIAGNOSIS — E83.110 HEREDITARY HEMOCHROMATOSIS: ICD-10-CM

## 2024-07-10 PROCEDURE — 25510000001 IOPAMIDOL 61 % SOLUTION: Performed by: INTERNAL MEDICINE

## 2024-07-10 PROCEDURE — 74177 CT ABD & PELVIS W/CONTRAST: CPT

## 2024-07-10 RX ADMIN — IOPAMIDOL 95 ML: 612 INJECTION, SOLUTION INTRAVENOUS at 16:26

## 2024-08-01 ENCOUNTER — HOSPITAL ENCOUNTER (OUTPATIENT)
Dept: INFUSION THERAPY | Facility: HOSPITAL | Age: 54
Discharge: HOME | End: 2024-08-01
Admitting: NURSE PRACTITIONER
Payer: COMMERCIAL

## 2024-08-01 VITALS
RESPIRATION RATE: 16 BRPM | OXYGEN SATURATION: 94 % | HEART RATE: 88 BPM | SYSTOLIC BLOOD PRESSURE: 132 MMHG | TEMPERATURE: 98.8 F | DIASTOLIC BLOOD PRESSURE: 84 MMHG

## 2024-08-01 DIAGNOSIS — E83.110 HEMOCHROMATOSIS, HEREDITARY: Primary | ICD-10-CM

## 2024-08-01 LAB
ALBUMIN SERPL-MCNC: 4.3 G/DL (ref 3.5–5.2)
ALBUMIN/GLOB SERPL: 1.6 G/DL
ALP SERPL-CCNC: 69 U/L (ref 39–117)
ALT SERPL W P-5'-P-CCNC: 118 U/L (ref 1–41)
ANION GAP SERPL CALCULATED.3IONS-SCNC: 14.5 MMOL/L (ref 5–15)
AST SERPL-CCNC: 91 U/L (ref 1–40)
BILIRUB SERPL-MCNC: 0.6 MG/DL (ref 0–1.2)
BUN SERPL-MCNC: 11 MG/DL (ref 6–20)
BUN/CREAT SERPL: 11.2 (ref 7–25)
CALCIUM SPEC-SCNC: 9.3 MG/DL (ref 8.6–10.5)
CHLORIDE SERPL-SCNC: 100 MMOL/L (ref 98–107)
CO2 SERPL-SCNC: 22.5 MMOL/L (ref 22–29)
CREAT SERPL-MCNC: 0.98 MG/DL (ref 0.76–1.27)
EGFRCR SERPLBLD CKD-EPI 2021: 90.5 ML/MIN/1.73
FERRITIN SERPL-MCNC: 67.9 NG/ML (ref 30–400)
GLOBULIN UR ELPH-MCNC: 2.7 GM/DL
GLUCOSE SERPL-MCNC: 140 MG/DL (ref 65–99)
HCT VFR BLD AUTO: 46.6 % (ref 37.5–51)
HGB BLD-MCNC: 16 G/DL (ref 13–17.7)
INR PPP: 0.95 (ref 0.9–1.1)
POTASSIUM SERPL-SCNC: 4.1 MMOL/L (ref 3.5–5.2)
PROT SERPL-MCNC: 7 G/DL (ref 6–8.5)
PROTHROMBIN TIME: 13.1 SECONDS (ref 12.3–15.1)
SODIUM SERPL-SCNC: 137 MMOL/L (ref 136–145)

## 2024-08-01 PROCEDURE — 85014 HEMATOCRIT: CPT | Performed by: INTERNAL MEDICINE

## 2024-08-01 PROCEDURE — 99195 PHLEBOTOMY: CPT

## 2024-08-01 PROCEDURE — 80053 COMPREHEN METABOLIC PANEL: CPT | Performed by: INTERNAL MEDICINE

## 2024-08-01 PROCEDURE — 82728 ASSAY OF FERRITIN: CPT | Performed by: INTERNAL MEDICINE

## 2024-08-01 PROCEDURE — 85610 PROTHROMBIN TIME: CPT | Performed by: INTERNAL MEDICINE

## 2024-08-01 PROCEDURE — 85018 HEMOGLOBIN: CPT | Performed by: INTERNAL MEDICINE

## 2024-08-01 RX ORDER — SODIUM CHLORIDE 9 MG/ML
250 INJECTION, SOLUTION INTRAVENOUS ONCE
Status: DISCONTINUED | OUTPATIENT
Start: 2024-08-01 | End: 2024-08-03 | Stop reason: HOSPADM

## 2024-08-01 RX ORDER — SODIUM CHLORIDE 9 MG/ML
250 INJECTION, SOLUTION INTRAVENOUS ONCE
OUTPATIENT
Start: 2024-08-08

## 2024-10-02 PROBLEM — R50.9 FEVER: Status: ACTIVE | Noted: 2024-10-02

## 2024-10-31 ENCOUNTER — TRANSCRIBE ORDERS (OUTPATIENT)
Dept: HOSPITALIST | Facility: HOSPITAL | Age: 54
End: 2024-10-31
Payer: COMMERCIAL

## 2024-10-31 DIAGNOSIS — M50.00 DISC DISEASE WITH MYELOPATHY, CERVICAL: Primary | ICD-10-CM

## 2024-11-07 ENCOUNTER — HOSPITAL ENCOUNTER (OUTPATIENT)
Facility: HOSPITAL | Age: 54
Discharge: HOME | End: 2024-11-07
Admitting: NURSE PRACTITIONER
Payer: COMMERCIAL

## 2024-11-07 VITALS
HEART RATE: 84 BPM | RESPIRATION RATE: 18 BRPM | SYSTOLIC BLOOD PRESSURE: 124 MMHG | DIASTOLIC BLOOD PRESSURE: 81 MMHG | OXYGEN SATURATION: 98 %

## 2024-11-07 DIAGNOSIS — E83.110 HEMOCHROMATOSIS, HEREDITARY: Primary | ICD-10-CM

## 2024-11-07 LAB
HCT VFR BLD AUTO: 46.1 % (ref 37.5–51)
HGB BLD-MCNC: 14.7 G/DL (ref 13–17.7)

## 2024-11-07 PROCEDURE — 85014 HEMATOCRIT: CPT | Performed by: NURSE PRACTITIONER

## 2024-11-07 PROCEDURE — 99195 PHLEBOTOMY: CPT

## 2024-11-07 PROCEDURE — 85018 HEMOGLOBIN: CPT | Performed by: NURSE PRACTITIONER

## 2024-11-07 RX ORDER — SODIUM CHLORIDE 9 MG/ML
250 INJECTION, SOLUTION INTRAVENOUS ONCE
Status: DISCONTINUED | OUTPATIENT
Start: 2024-11-07 | End: 2024-11-08 | Stop reason: HOSPADM

## 2024-11-07 RX ORDER — SODIUM CHLORIDE 9 MG/ML
250 INJECTION, SOLUTION INTRAVENOUS ONCE
OUTPATIENT
Start: 2024-11-14

## 2024-11-12 ENCOUNTER — TRANSCRIBE ORDERS (OUTPATIENT)
Facility: HOSPITAL | Age: 54
End: 2024-11-12
Payer: COMMERCIAL

## 2024-11-12 DIAGNOSIS — E83.110 HEMOCHROMATOSIS, HEREDITARY: Primary | ICD-10-CM

## 2025-05-08 ENCOUNTER — HOSPITAL ENCOUNTER (OUTPATIENT)
Facility: HOSPITAL | Age: 55
Discharge: HOME | End: 2025-05-08
Admitting: INTERNAL MEDICINE
Payer: COMMERCIAL

## 2025-05-08 VITALS — DIASTOLIC BLOOD PRESSURE: 76 MMHG | OXYGEN SATURATION: 97 % | HEART RATE: 83 BPM | SYSTOLIC BLOOD PRESSURE: 120 MMHG

## 2025-05-08 DIAGNOSIS — E83.110 HEMOCHROMATOSIS, HEREDITARY: Primary | ICD-10-CM

## 2025-05-08 LAB
ALBUMIN SERPL-MCNC: 4.3 G/DL (ref 3.5–5.2)
ALBUMIN/GLOB SERPL: 1.8 G/DL
ALP SERPL-CCNC: 54 U/L (ref 39–117)
ALT SERPL W P-5'-P-CCNC: 42 U/L (ref 1–41)
ANION GAP SERPL CALCULATED.3IONS-SCNC: 10.3 MMOL/L (ref 5–15)
AST SERPL-CCNC: 40 U/L (ref 1–40)
BASOPHILS # BLD AUTO: 0.03 10*3/MM3 (ref 0–0.2)
BASOPHILS NFR BLD AUTO: 0.8 % (ref 0–1.5)
BILIRUB SERPL-MCNC: 0.6 MG/DL (ref 0–1.2)
BUN SERPL-MCNC: 9 MG/DL (ref 6–20)
BUN/CREAT SERPL: 8.8 (ref 7–25)
CALCIUM SPEC-SCNC: 9.1 MG/DL (ref 8.6–10.5)
CHLORIDE SERPL-SCNC: 102 MMOL/L (ref 98–107)
CO2 SERPL-SCNC: 28.7 MMOL/L (ref 22–29)
CREAT SERPL-MCNC: 1.02 MG/DL (ref 0.76–1.27)
DEPRECATED RDW RBC AUTO: 55.6 FL (ref 37–54)
EGFRCR SERPLBLD CKD-EPI 2021: 85.7 ML/MIN/1.73
EOSINOPHIL # BLD AUTO: 0.08 10*3/MM3 (ref 0–0.4)
EOSINOPHIL NFR BLD AUTO: 2.1 % (ref 0.3–6.2)
ERYTHROCYTE [DISTWIDTH] IN BLOOD BY AUTOMATED COUNT: 16.3 % (ref 12.3–15.4)
GLOBULIN UR ELPH-MCNC: 2.4 GM/DL
GLUCOSE SERPL-MCNC: 135 MG/DL (ref 65–99)
HCT VFR BLD AUTO: 48.3 % (ref 37.5–51)
HGB BLD-MCNC: 15.6 G/DL (ref 13–17.7)
IMM GRANULOCYTES # BLD AUTO: 0.01 10*3/MM3 (ref 0–0.05)
IMM GRANULOCYTES NFR BLD AUTO: 0.3 % (ref 0–0.5)
INR PPP: 2.34 (ref 0.9–1.1)
LYMPHOCYTES # BLD AUTO: 1.14 10*3/MM3 (ref 0.7–3.1)
LYMPHOCYTES NFR BLD AUTO: 30.1 % (ref 19.6–45.3)
MCH RBC QN AUTO: 29.9 PG (ref 26.6–33)
MCHC RBC AUTO-ENTMCNC: 32.3 G/DL (ref 31.5–35.7)
MCV RBC AUTO: 92.7 FL (ref 79–97)
MONOCYTES # BLD AUTO: 0.23 10*3/MM3 (ref 0.1–0.9)
MONOCYTES NFR BLD AUTO: 6.1 % (ref 5–12)
NEUTROPHILS NFR BLD AUTO: 2.3 10*3/MM3 (ref 1.7–7)
NEUTROPHILS NFR BLD AUTO: 60.6 % (ref 42.7–76)
NRBC BLD AUTO-RTO: 0 /100 WBC (ref 0–0.2)
PLATELET # BLD AUTO: 137 10*3/MM3 (ref 140–450)
PMV BLD AUTO: 9.7 FL (ref 6–12)
POTASSIUM SERPL-SCNC: 3.8 MMOL/L (ref 3.5–5.2)
PROT SERPL-MCNC: 6.7 G/DL (ref 6–8.5)
PROTHROMBIN TIME: 27.4 SECONDS (ref 12.3–15.1)
RBC # BLD AUTO: 5.21 10*6/MM3 (ref 4.14–5.8)
SODIUM SERPL-SCNC: 141 MMOL/L (ref 136–145)
WBC NRBC COR # BLD AUTO: 3.79 10*3/MM3 (ref 3.4–10.8)

## 2025-05-08 PROCEDURE — 99195 PHLEBOTOMY: CPT

## 2025-05-08 PROCEDURE — 85610 PROTHROMBIN TIME: CPT | Performed by: INTERNAL MEDICINE

## 2025-05-08 PROCEDURE — 80053 COMPREHEN METABOLIC PANEL: CPT | Performed by: INTERNAL MEDICINE

## 2025-05-08 PROCEDURE — 85025 COMPLETE CBC W/AUTO DIFF WBC: CPT | Performed by: INTERNAL MEDICINE

## 2025-05-08 RX ORDER — SODIUM CHLORIDE 9 MG/ML
250 INJECTION, SOLUTION INTRAVENOUS ONCE
Status: DISCONTINUED | OUTPATIENT
Start: 2025-05-08 | End: 2025-05-09 | Stop reason: HOSPADM

## 2025-05-08 RX ORDER — SODIUM CHLORIDE 9 MG/ML
250 INJECTION, SOLUTION INTRAVENOUS ONCE
OUTPATIENT
Start: 2025-05-15

## 2025-05-27 NOTE — TELEPHONE ENCOUNTER
Caller: HEATHER FUNG    Relationship: PATIENT    Best call back number: [phone]    What is the best time to reach you: ANYTIME    Who are you requesting to speak with (clinical staff, provider,  specific staff member): DR JENKINS OR MA    What was the call regarding: PATIENT HAVING ISSUES WITH URETHRA TEAR    Do you require a callback: STATES TO CALL ANYTIME AND LEAVE A MESSAGE          
Sent [facility] to schedule appt with TA Patel  
Is This A New Presentation, Or A Follow-Up?: Skin Lesions
What Type Of Note Output Would You Prefer (Optional)?: Bullet Format
How Severe Is Your Skin Lesion?: mild
Has Your Skin Lesion Been Treated?: not been treated

## 2025-08-26 ENCOUNTER — HOSPITAL ENCOUNTER (OUTPATIENT)
Facility: HOSPITAL | Age: 55
Discharge: HOME | End: 2025-08-26
Admitting: INTERNAL MEDICINE
Payer: COMMERCIAL

## 2025-08-26 VITALS
WEIGHT: 228.1 LBS | SYSTOLIC BLOOD PRESSURE: 130 MMHG | TEMPERATURE: 97.7 F | BODY MASS INDEX: 30.94 KG/M2 | DIASTOLIC BLOOD PRESSURE: 87 MMHG | RESPIRATION RATE: 18 BRPM | HEART RATE: 75 BPM | OXYGEN SATURATION: 99 %

## 2025-08-26 DIAGNOSIS — E83.110 HEMOCHROMATOSIS, HEREDITARY: Primary | ICD-10-CM

## 2025-08-26 LAB
ALBUMIN SERPL-MCNC: 4.3 G/DL (ref 3.5–5.2)
ALBUMIN/GLOB SERPL: 1.5 G/DL
ALP SERPL-CCNC: 55 U/L (ref 39–117)
ALT SERPL W P-5'-P-CCNC: 30 U/L (ref 1–41)
ANION GAP SERPL CALCULATED.3IONS-SCNC: 12.3 MMOL/L (ref 5–15)
AST SERPL-CCNC: 31 U/L (ref 1–40)
BASOPHILS # BLD AUTO: 0.05 10*3/MM3 (ref 0–0.2)
BASOPHILS NFR BLD AUTO: 1 % (ref 0–1.5)
BILIRUB SERPL-MCNC: 0.5 MG/DL (ref 0–1.2)
BUN SERPL-MCNC: 8 MG/DL (ref 6–20)
BUN/CREAT SERPL: 8.3 (ref 7–25)
CALCIUM SPEC-SCNC: 8.9 MG/DL (ref 8.6–10.5)
CHLORIDE SERPL-SCNC: 100 MMOL/L (ref 98–107)
CO2 SERPL-SCNC: 26.7 MMOL/L (ref 22–29)
CREAT SERPL-MCNC: 0.96 MG/DL (ref 0.76–1.27)
DEPRECATED RDW RBC AUTO: 55.2 FL (ref 37–54)
EGFRCR SERPLBLD CKD-EPI 2021: 92.2 ML/MIN/1.73
EOSINOPHIL # BLD AUTO: 0.1 10*3/MM3 (ref 0–0.4)
EOSINOPHIL NFR BLD AUTO: 2 % (ref 0.3–6.2)
ERYTHROCYTE [DISTWIDTH] IN BLOOD BY AUTOMATED COUNT: 15.8 % (ref 12.3–15.4)
FERRITIN SERPL-MCNC: 47.08 NG/ML (ref 30–400)
GLOBULIN UR ELPH-MCNC: 2.9 GM/DL
GLUCOSE SERPL-MCNC: 73 MG/DL (ref 65–99)
HCT VFR BLD AUTO: 49.3 % (ref 37.5–51)
HGB BLD-MCNC: 16.6 G/DL (ref 13–17.7)
IMM GRANULOCYTES # BLD AUTO: 0.01 10*3/MM3 (ref 0–0.05)
IMM GRANULOCYTES NFR BLD AUTO: 0.2 % (ref 0–0.5)
INR PPP: 2.13 (ref 0.9–1.1)
IRON 24H UR-MRATE: 73 MCG/DL (ref 59–158)
IRON SATN MFR SERPL: 18 % (ref 20–50)
LYMPHOCYTES # BLD AUTO: 1.11 10*3/MM3 (ref 0.7–3.1)
LYMPHOCYTES NFR BLD AUTO: 22.7 % (ref 19.6–45.3)
MCH RBC QN AUTO: 31.9 PG (ref 26.6–33)
MCHC RBC AUTO-ENTMCNC: 33.7 G/DL (ref 31.5–35.7)
MCV RBC AUTO: 94.8 FL (ref 79–97)
MONOCYTES # BLD AUTO: 0.43 10*3/MM3 (ref 0.1–0.9)
MONOCYTES NFR BLD AUTO: 8.8 % (ref 5–12)
NEUTROPHILS NFR BLD AUTO: 3.18 10*3/MM3 (ref 1.7–7)
NEUTROPHILS NFR BLD AUTO: 65.3 % (ref 42.7–76)
NRBC BLD AUTO-RTO: 0 /100 WBC (ref 0–0.2)
PLATELET # BLD AUTO: 142 10*3/MM3 (ref 140–450)
PMV BLD AUTO: 9 FL (ref 6–12)
POTASSIUM SERPL-SCNC: 4.2 MMOL/L (ref 3.5–5.2)
PROT SERPL-MCNC: 7.2 G/DL (ref 6–8.5)
PROTHROMBIN TIME: 25.4 SECONDS (ref 12.3–15.1)
RBC # BLD AUTO: 5.2 10*6/MM3 (ref 4.14–5.8)
SODIUM SERPL-SCNC: 139 MMOL/L (ref 136–145)
TIBC SERPL-MCNC: 407 MCG/DL (ref 298–536)
TRANSFERRIN SERPL-MCNC: 273 MG/DL (ref 200–360)
WBC NRBC COR # BLD AUTO: 4.88 10*3/MM3 (ref 3.4–10.8)

## 2025-08-26 PROCEDURE — 36415 COLL VENOUS BLD VENIPUNCTURE: CPT

## 2025-08-26 PROCEDURE — 83540 ASSAY OF IRON: CPT | Performed by: INTERNAL MEDICINE

## 2025-08-26 PROCEDURE — 80053 COMPREHEN METABOLIC PANEL: CPT | Performed by: NURSE PRACTITIONER

## 2025-08-26 PROCEDURE — 82728 ASSAY OF FERRITIN: CPT | Performed by: INTERNAL MEDICINE

## 2025-08-26 PROCEDURE — 84466 ASSAY OF TRANSFERRIN: CPT | Performed by: INTERNAL MEDICINE

## 2025-08-26 PROCEDURE — 99195 PHLEBOTOMY: CPT

## 2025-08-26 PROCEDURE — 85025 COMPLETE CBC W/AUTO DIFF WBC: CPT | Performed by: NURSE PRACTITIONER

## 2025-08-26 PROCEDURE — 85610 PROTHROMBIN TIME: CPT | Performed by: NURSE PRACTITIONER

## 2025-08-26 RX ORDER — SODIUM CHLORIDE 9 MG/ML
250 INJECTION, SOLUTION INTRAVENOUS ONCE
Status: DISCONTINUED | OUTPATIENT
Start: 2025-08-26 | End: 2025-08-27 | Stop reason: HOSPADM

## 2025-08-26 RX ORDER — SODIUM CHLORIDE 9 MG/ML
250 INJECTION, SOLUTION INTRAVENOUS ONCE
OUTPATIENT
Start: 2025-09-02